# Patient Record
Sex: MALE | Race: ASIAN | Employment: OTHER | URBAN - METROPOLITAN AREA
[De-identification: names, ages, dates, MRNs, and addresses within clinical notes are randomized per-mention and may not be internally consistent; named-entity substitution may affect disease eponyms.]

---

## 2017-01-01 ENCOUNTER — HOSPITAL ENCOUNTER (OUTPATIENT)
Dept: CT IMAGING | Facility: HOSPITAL | Age: 77
Discharge: HOME | End: 2017-01-01
Attending: INTERNAL MEDICINE
Payer: MEDICARE

## 2017-01-01 ENCOUNTER — APPOINTMENT (OUTPATIENT)
Dept: CV DIAGNOSTICS | Facility: HOSPITAL | Age: 77
DRG: 308 | End: 2017-01-01
Attending: INTERNAL MEDICINE
Payer: MEDICARE

## 2017-01-01 ENCOUNTER — HOSPITAL ENCOUNTER (INPATIENT)
Facility: HOSPITAL | Age: 77
LOS: 1 days | Discharge: HOME | DRG: 308 | End: 2017-01-01
Attending: EMERGENCY MEDICINE | Admitting: INTERNAL MEDICINE
Payer: MEDICARE

## 2017-01-01 ENCOUNTER — APPOINTMENT (OUTPATIENT)
Dept: CT IMAGING | Facility: HOSPITAL | Age: 77
DRG: 308 | End: 2017-01-01
Attending: HOSPITALIST
Payer: MEDICARE

## 2017-01-01 ENCOUNTER — APPOINTMENT (OUTPATIENT)
Dept: LAB | Facility: HOSPITAL | Age: 77
End: 2017-01-01
Attending: INTERNAL MEDICINE
Payer: MEDICARE

## 2017-01-01 ENCOUNTER — HOSPITAL ENCOUNTER (EMERGENCY)
Facility: HOSPITAL | Age: 77
Discharge: HOME | End: 2017-01-01
Payer: MEDICARE

## 2017-01-01 ENCOUNTER — APPOINTMENT (OUTPATIENT)
Dept: GENERAL RADIOLOGY | Facility: HOSPITAL | Age: 77
DRG: 308 | End: 2017-01-01
Attending: EMERGENCY MEDICINE
Payer: MEDICARE

## 2017-01-01 ENCOUNTER — APPOINTMENT (OUTPATIENT)
Dept: ULTRASOUND IMAGING | Facility: HOSPITAL | Age: 77
DRG: 308 | End: 2017-01-01
Attending: INTERNAL MEDICINE
Payer: MEDICARE

## 2017-01-01 ENCOUNTER — APPOINTMENT (OUTPATIENT)
Dept: CT IMAGING | Facility: HOSPITAL | Age: 77
End: 2017-01-01
Attending: INTERNAL MEDICINE
Payer: MEDICARE

## 2017-01-01 ENCOUNTER — HOSPITAL ENCOUNTER (OUTPATIENT)
Dept: NUCLEAR MEDICINE | Facility: HOSPITAL | Age: 77
Discharge: HOME | End: 2017-01-01
Attending: INTERNAL MEDICINE
Payer: MEDICARE

## 2017-01-01 ENCOUNTER — HOSPITAL ENCOUNTER (OUTPATIENT)
Dept: CV DIAGNOSTICS | Facility: HOSPITAL | Age: 77
Discharge: HOME | DRG: 308 | End: 2017-01-01
Attending: INTERNAL MEDICINE
Payer: MEDICARE

## 2017-01-01 ENCOUNTER — HOSPITAL ENCOUNTER (OUTPATIENT)
Dept: INTERVENTIONAL RADIOLOGY/VASCULAR | Facility: HOSPITAL | Age: 77
Discharge: HOME | End: 2017-01-01
Attending: INTERNAL MEDICINE | Admitting: INTERNAL MEDICINE
Payer: MEDICARE

## 2017-01-01 VITALS
HEART RATE: 75 BPM | WEIGHT: 132 LBS | TEMPERATURE: 98 F | BODY MASS INDEX: 19.55 KG/M2 | SYSTOLIC BLOOD PRESSURE: 164 MMHG | DIASTOLIC BLOOD PRESSURE: 101 MMHG | HEIGHT: 69 IN | RESPIRATION RATE: 19 BRPM | OXYGEN SATURATION: 100 %

## 2017-01-01 VITALS
OXYGEN SATURATION: 100 % | TEMPERATURE: 98 F | RESPIRATION RATE: 20 BRPM | BODY MASS INDEX: 18.96 KG/M2 | HEIGHT: 69 IN | SYSTOLIC BLOOD PRESSURE: 132 MMHG | HEART RATE: 68 BPM | WEIGHT: 128 LBS | DIASTOLIC BLOOD PRESSURE: 87 MMHG

## 2017-01-01 VITALS
HEART RATE: 96 BPM | SYSTOLIC BLOOD PRESSURE: 132 MMHG | TEMPERATURE: 97 F | HEIGHT: 69 IN | DIASTOLIC BLOOD PRESSURE: 90 MMHG | BODY MASS INDEX: 20.73 KG/M2 | WEIGHT: 140 LBS | OXYGEN SATURATION: 98 % | RESPIRATION RATE: 18 BRPM

## 2017-01-01 VITALS — WEIGHT: 132 LBS | HEIGHT: 69 IN | BODY MASS INDEX: 19.55 KG/M2

## 2017-01-01 VITALS
SYSTOLIC BLOOD PRESSURE: 142 MMHG | DIASTOLIC BLOOD PRESSURE: 95 MMHG | RESPIRATION RATE: 20 BRPM | HEART RATE: 85 BPM | OXYGEN SATURATION: 99 % | TEMPERATURE: 97 F | BODY MASS INDEX: 19.55 KG/M2 | HEIGHT: 69 IN | WEIGHT: 132 LBS

## 2017-01-01 DIAGNOSIS — C78.7 METASTASIS TO LIVER (HCC): Primary | ICD-10-CM

## 2017-01-01 DIAGNOSIS — C78.7 SECONDARY MALIGNANT NEOPLASM OF LIVER (HCC): ICD-10-CM

## 2017-01-01 DIAGNOSIS — C78.7 METASTASIS TO LIVER (HCC): ICD-10-CM

## 2017-01-01 DIAGNOSIS — C18.7 MALIGNANT NEOPLASM OF SIGMOID COLON (HCC): ICD-10-CM

## 2017-01-01 DIAGNOSIS — C78.01 SECONDARY MALIGNANT NEOPLASM OF RIGHT LUNG (HCC): ICD-10-CM

## 2017-01-01 DIAGNOSIS — I48.91 ATRIAL FIBRILLATION WITH RAPID VENTRICULAR RESPONSE (HCC): Primary | ICD-10-CM

## 2017-01-01 DIAGNOSIS — C78.00 COLON CANCER METASTASIZED TO LUNG (HCC): ICD-10-CM

## 2017-01-01 DIAGNOSIS — C18.9 COLON CANCER METASTASIZED TO LUNG (HCC): ICD-10-CM

## 2017-01-01 DIAGNOSIS — C18.9 COLON CARCINOMA METASTATIC TO LIVER (HCC): ICD-10-CM

## 2017-01-01 DIAGNOSIS — C78.02 SECONDARY MALIGNANT NEOPLASM OF LEFT LUNG (HCC): ICD-10-CM

## 2017-01-01 DIAGNOSIS — R33.9 URINARY RETENTION: Primary | ICD-10-CM

## 2017-01-01 DIAGNOSIS — Z91.89 COLON CANCER HIGH RISK: ICD-10-CM

## 2017-01-01 DIAGNOSIS — C78.7 COLON CARCINOMA METASTATIC TO LIVER (HCC): ICD-10-CM

## 2017-01-01 DIAGNOSIS — R73.09 ELEVATED GLUCOSE: ICD-10-CM

## 2017-01-01 LAB
BRAF CRC: NOT DETECTED
ERYTHROCYTE [DISTWIDTH] IN BLOOD BY AUTOMATED COUNT: 14 % (ref 11.5–16)
GLUCOSE BLD-MCNC: 105 MG/DL (ref 65–99)
HCT VFR BLD AUTO: 46.6 % (ref 37–53)
HGB BLD-MCNC: 15.4 G/DL (ref 13–17)
INR BLD: 1.07 (ref 0.89–1.11)
INR CARTRIDGE LOT #: 210
INR: 1.1 (ref 0.8–1.3)
KRAS CRC: DETECTED
MCH RBC QN AUTO: 28.8 PG (ref 27–33.2)
MCHC RBC AUTO-ENTMCNC: 33 G/DL (ref 31–37)
MCV RBC AUTO: 87.3 FL (ref 80–99)
NRAS CRC: NOT DETECTED
PERCENT OF CELLS/CIRCUMFEREN: 0
PIK3CA CRC: NOT DETECTED
PLATELET # BLD AUTO: 267 10(3)UL (ref 150–450)
PSA SERPL DL<=0.01 NG/ML-MCNC: 13.9 SECONDS (ref 12–14.3)
RBC # BLD AUTO: 5.34 X10(6)UL (ref 3.8–5.8)
RED CELL DISTRIBUTION WIDTH-SD: 44.4 FL (ref 35.1–46.3)
WBC # BLD AUTO: 7.3 X10(3) UL (ref 4–13)

## 2017-01-01 PROCEDURE — 0JH60WZ INSERTION OF TOTALLY IMPLANTABLE VASCULAR ACCESS DEVICE INTO CHEST SUBCUTANEOUS TISSUE AND FASCIA, OPEN APPROACH: ICD-10-PCS | Performed by: RADIOLOGY

## 2017-01-01 PROCEDURE — 88307 TISSUE EXAM BY PATHOLOGIST: CPT | Performed by: INTERNAL MEDICINE

## 2017-01-01 PROCEDURE — 80053 COMPREHEN METABOLIC PANEL: CPT | Performed by: EMERGENCY MEDICINE

## 2017-01-01 PROCEDURE — 85027 COMPLETE CBC AUTOMATED: CPT | Performed by: RADIOLOGY

## 2017-01-01 PROCEDURE — 99285 EMERGENCY DEPT VISIT HI MDM: CPT

## 2017-01-01 PROCEDURE — 85027 COMPLETE CBC AUTOMATED: CPT | Performed by: INTERNAL MEDICINE

## 2017-01-01 PROCEDURE — 71010 XR CHEST AP PORTABLE  (CPT=71010): CPT | Performed by: EMERGENCY MEDICINE

## 2017-01-01 PROCEDURE — 93306 TTE W/DOPPLER COMPLETE: CPT | Performed by: INTERNAL MEDICINE

## 2017-01-01 PROCEDURE — 81210 BRAF GENE: CPT | Performed by: INTERNAL MEDICINE

## 2017-01-01 PROCEDURE — 81003 URINALYSIS AUTO W/O SCOPE: CPT | Performed by: INTERNAL MEDICINE

## 2017-01-01 PROCEDURE — 99152 MOD SED SAME PHYS/QHP 5/>YRS: CPT

## 2017-01-01 PROCEDURE — 74177 CT ABD & PELVIS W/CONTRAST: CPT | Performed by: INTERNAL MEDICINE

## 2017-01-01 PROCEDURE — 83036 HEMOGLOBIN GLYCOSYLATED A1C: CPT | Performed by: HOSPITALIST

## 2017-01-01 PROCEDURE — 93005 ELECTROCARDIOGRAM TRACING: CPT

## 2017-01-01 PROCEDURE — 36415 COLL VENOUS BLD VENIPUNCTURE: CPT

## 2017-01-01 PROCEDURE — 85730 THROMBOPLASTIN TIME PARTIAL: CPT | Performed by: INTERNAL MEDICINE

## 2017-01-01 PROCEDURE — 77012 CT SCAN FOR NEEDLE BIOPSY: CPT | Performed by: INTERNAL MEDICINE

## 2017-01-01 PROCEDURE — 77001 FLUOROGUIDE FOR VEIN DEVICE: CPT

## 2017-01-01 PROCEDURE — 85025 COMPLETE CBC W/AUTO DIFF WBC: CPT | Performed by: INTERNAL MEDICINE

## 2017-01-01 PROCEDURE — 02HV33Z INSERTION OF INFUSION DEVICE INTO SUPERIOR VENA CAVA, PERCUTANEOUS APPROACH: ICD-10-PCS | Performed by: RADIOLOGY

## 2017-01-01 PROCEDURE — 99283 EMERGENCY DEPT VISIT LOW MDM: CPT

## 2017-01-01 PROCEDURE — 82962 GLUCOSE BLOOD TEST: CPT

## 2017-01-01 PROCEDURE — 81001 URINALYSIS AUTO W/SCOPE: CPT

## 2017-01-01 PROCEDURE — 80048 BASIC METABOLIC PNL TOTAL CA: CPT | Performed by: HOSPITALIST

## 2017-01-01 PROCEDURE — 99153 MOD SED SAME PHYS/QHP EA: CPT

## 2017-01-01 PROCEDURE — 88381 MICRODISSECTION MANUAL: CPT | Performed by: INTERNAL MEDICINE

## 2017-01-01 PROCEDURE — 76937 US GUIDE VASCULAR ACCESS: CPT

## 2017-01-01 PROCEDURE — 93010 ELECTROCARDIOGRAM REPORT: CPT

## 2017-01-01 PROCEDURE — 93010 ELECTROCARDIOGRAM REPORT: CPT | Performed by: INTERNAL MEDICINE

## 2017-01-01 PROCEDURE — 78815 PET IMAGE W/CT SKULL-THIGH: CPT | Performed by: INTERNAL MEDICINE

## 2017-01-01 PROCEDURE — 84443 ASSAY THYROID STIM HORMONE: CPT | Performed by: EMERGENCY MEDICINE

## 2017-01-01 PROCEDURE — 84484 ASSAY OF TROPONIN QUANT: CPT | Performed by: EMERGENCY MEDICINE

## 2017-01-01 PROCEDURE — 85378 FIBRIN DEGRADE SEMIQUANT: CPT | Performed by: HOSPITALIST

## 2017-01-01 PROCEDURE — 85007 BL SMEAR W/DIFF WBC COUNT: CPT | Performed by: EMERGENCY MEDICINE

## 2017-01-01 PROCEDURE — 36592 COLLECT BLOOD FROM PICC: CPT

## 2017-01-01 PROCEDURE — 81275 KRAS GENE VARIANTS EXON 2: CPT | Performed by: INTERNAL MEDICINE

## 2017-01-01 PROCEDURE — 85610 PROTHROMBIN TIME: CPT | Performed by: EMERGENCY MEDICINE

## 2017-01-01 PROCEDURE — 85730 THROMBOPLASTIN TIME PARTIAL: CPT | Performed by: HOSPITALIST

## 2017-01-01 PROCEDURE — 96366 THER/PROPH/DIAG IV INF ADDON: CPT

## 2017-01-01 PROCEDURE — 81301 MICROSATELLITE INSTABILITY: CPT | Performed by: INTERNAL MEDICINE

## 2017-01-01 PROCEDURE — 85007 BL SMEAR W/DIFF WBC COUNT: CPT | Performed by: INTERNAL MEDICINE

## 2017-01-01 PROCEDURE — 96365 THER/PROPH/DIAG IV INF INIT: CPT

## 2017-01-01 PROCEDURE — 85025 COMPLETE CBC W/AUTO DIFF WBC: CPT | Performed by: EMERGENCY MEDICINE

## 2017-01-01 PROCEDURE — 71260 CT THORAX DX C+: CPT | Performed by: INTERNAL MEDICINE

## 2017-01-01 PROCEDURE — 85027 COMPLETE CBC AUTOMATED: CPT | Performed by: EMERGENCY MEDICINE

## 2017-01-01 PROCEDURE — 82565 ASSAY OF CREATININE: CPT

## 2017-01-01 PROCEDURE — 99152 MOD SED SAME PHYS/QHP 5/>YRS: CPT | Performed by: INTERNAL MEDICINE

## 2017-01-01 PROCEDURE — 71275 CT ANGIOGRAPHY CHEST: CPT | Performed by: HOSPITALIST

## 2017-01-01 PROCEDURE — 36561 INSERT TUNNELED CV CATH: CPT

## 2017-01-01 PROCEDURE — B518ZZA FLUOROSCOPY OF SUPERIOR VENA CAVA, GUIDANCE: ICD-10-PCS | Performed by: RADIOLOGY

## 2017-01-01 PROCEDURE — 88341 IMHCHEM/IMCYTCHM EA ADD ANTB: CPT | Performed by: INTERNAL MEDICINE

## 2017-01-01 PROCEDURE — 47000 NEEDLE BIOPSY OF LIVER PERQ: CPT | Performed by: INTERNAL MEDICINE

## 2017-01-01 PROCEDURE — 85610 PROTHROMBIN TIME: CPT

## 2017-01-01 PROCEDURE — 93970 EXTREMITY STUDY: CPT | Performed by: INTERNAL MEDICINE

## 2017-01-01 PROCEDURE — 51702 INSERT TEMP BLADDER CATH: CPT

## 2017-01-01 PROCEDURE — 88342 IMHCHEM/IMCYTCHM 1ST ANTB: CPT | Performed by: INTERNAL MEDICINE

## 2017-01-01 PROCEDURE — 81479 UNLISTED MOLECULAR PATHOLOGY: CPT | Performed by: INTERNAL MEDICINE

## 2017-01-01 PROCEDURE — 81404 MOPATH PROCEDURE LEVEL 5: CPT | Performed by: INTERNAL MEDICINE

## 2017-01-01 PROCEDURE — 88360 TUMOR IMMUNOHISTOCHEM/MANUAL: CPT | Performed by: INTERNAL MEDICINE

## 2017-01-01 RX ORDER — BLOOD-GLUCOSE METER
KIT MISCELLANEOUS
Qty: 1 KIT | Refills: 0 | Status: SHIPPED | OUTPATIENT
Start: 2017-01-01 | End: 2018-01-01

## 2017-01-01 RX ORDER — DEXTROSE MONOHYDRATE 25 G/50ML
50 INJECTION, SOLUTION INTRAVENOUS
Status: DISCONTINUED | OUTPATIENT
Start: 2017-01-01 | End: 2017-01-01

## 2017-01-01 RX ORDER — ASPIRIN 325 MG
325 TABLET ORAL DAILY
Qty: 90 TABLET | Refills: 3 | Status: SHIPPED | OUTPATIENT
Start: 2017-01-01 | End: 2018-01-01

## 2017-01-01 RX ORDER — ONDANSETRON HYDROCHLORIDE 8 MG/1
8 TABLET, FILM COATED ORAL EVERY 8 HOURS PRN
COMMUNITY
End: 2018-01-01

## 2017-01-01 RX ORDER — MIDAZOLAM HYDROCHLORIDE 1 MG/ML
1 INJECTION INTRAMUSCULAR; INTRAVENOUS EVERY 5 MIN PRN
Status: ACTIVE | OUTPATIENT
Start: 2017-01-01 | End: 2017-01-01

## 2017-01-01 RX ORDER — SODIUM CHLORIDE 9 MG/ML
INJECTION, SOLUTION INTRAVENOUS CONTINUOUS
Status: ACTIVE | OUTPATIENT
Start: 2017-01-01 | End: 2017-01-01

## 2017-01-01 RX ORDER — METOPROLOL SUCCINATE 50 MG/1
50 TABLET, EXTENDED RELEASE ORAL
Status: DISCONTINUED | OUTPATIENT
Start: 2017-01-01 | End: 2017-01-01

## 2017-01-01 RX ORDER — HYDROCODONE BITARTRATE AND ACETAMINOPHEN 5; 325 MG/1; MG/1
2 TABLET ORAL EVERY 4 HOURS PRN
Status: DISCONTINUED | OUTPATIENT
Start: 2017-01-01 | End: 2017-01-01

## 2017-01-01 RX ORDER — HEPARIN SODIUM AND DEXTROSE 10000; 5 [USP'U]/100ML; G/100ML
18 INJECTION INTRAVENOUS ONCE
Status: COMPLETED | OUTPATIENT
Start: 2017-01-01 | End: 2017-01-01

## 2017-01-01 RX ORDER — ASPIRIN 325 MG
325 TABLET ORAL DAILY
Status: DISCONTINUED | OUTPATIENT
Start: 2017-01-01 | End: 2017-01-01

## 2017-01-01 RX ORDER — MIDAZOLAM HYDROCHLORIDE 1 MG/ML
INJECTION INTRAMUSCULAR; INTRAVENOUS
Status: COMPLETED
Start: 2017-01-01 | End: 2017-01-01

## 2017-01-01 RX ORDER — ACETAMINOPHEN 325 MG/1
650 TABLET ORAL EVERY 4 HOURS PRN
Status: DISCONTINUED | OUTPATIENT
Start: 2017-01-01 | End: 2017-01-01

## 2017-01-01 RX ORDER — NALOXONE HYDROCHLORIDE 0.4 MG/ML
80 INJECTION, SOLUTION INTRAMUSCULAR; INTRAVENOUS; SUBCUTANEOUS AS NEEDED
Status: DISCONTINUED | OUTPATIENT
Start: 2017-01-01 | End: 2017-01-01

## 2017-01-01 RX ORDER — HEPARIN SODIUM 5000 [USP'U]/ML
80 INJECTION INTRAVENOUS; SUBCUTANEOUS ONCE
Status: COMPLETED | OUTPATIENT
Start: 2017-01-01 | End: 2017-01-01

## 2017-01-01 RX ORDER — VIT A/VIT C/VIT E/ZINC/COPPER 7160-113
1 TABLET, DELAYED RELEASE (ENTERIC COATED) ORAL DAILY
COMMUNITY
End: 2018-01-01

## 2017-01-01 RX ORDER — ASPIRIN 81 MG/1
81 TABLET ORAL DAILY
Status: DISCONTINUED | OUTPATIENT
Start: 2017-01-01 | End: 2017-01-01

## 2017-01-01 RX ORDER — SODIUM CHLORIDE 9 MG/ML
INJECTION, SOLUTION INTRAVENOUS CONTINUOUS
Status: DISCONTINUED | OUTPATIENT
Start: 2017-01-01 | End: 2017-01-01

## 2017-01-01 RX ORDER — DILTIAZEM HYDROCHLORIDE 5 MG/ML
20 INJECTION INTRAVENOUS ONCE
Status: COMPLETED | OUTPATIENT
Start: 2017-01-01 | End: 2017-01-01

## 2017-01-01 RX ORDER — FINASTERIDE 5 MG/1
5 TABLET, FILM COATED ORAL DAILY
Status: DISCONTINUED | OUTPATIENT
Start: 2017-01-01 | End: 2017-01-01

## 2017-01-01 RX ORDER — CEFAZOLIN SODIUM 1 G/3ML
INJECTION, POWDER, FOR SOLUTION INTRAMUSCULAR; INTRAVENOUS
Status: COMPLETED
Start: 2017-01-01 | End: 2017-01-01

## 2017-01-01 RX ORDER — LIDOCAINE HYDROCHLORIDE 10 MG/ML
INJECTION, SOLUTION INFILTRATION; PERINEURAL
Status: COMPLETED
Start: 2017-01-01 | End: 2017-01-01

## 2017-01-01 RX ORDER — VITS A,C,E/LUTEIN/MINERALS 300MCG-200
1 TABLET ORAL DAILY
Status: DISCONTINUED | OUTPATIENT
Start: 2017-01-01 | End: 2017-01-01

## 2017-01-01 RX ORDER — HEPARIN SODIUM AND DEXTROSE 10000; 5 [USP'U]/100ML; G/100ML
INJECTION INTRAVENOUS CONTINUOUS
Status: DISCONTINUED | OUTPATIENT
Start: 2017-01-01 | End: 2017-01-01

## 2017-01-01 RX ORDER — GARLIC EXTRACT 500 MG
1 CAPSULE ORAL DAILY
COMMUNITY
End: 2018-01-01

## 2017-01-01 RX ORDER — DILTIAZEM HCL-SODIUM CHLORIDE IV SOLN 125 MG/125ML-0.9% 125-0.9/125 MG/ML-%
5 SOLUTION INTRAVENOUS CONTINUOUS
Status: DISCONTINUED | OUTPATIENT
Start: 2017-01-01 | End: 2017-01-01

## 2017-01-01 RX ORDER — HYDROCODONE BITARTRATE AND ACETAMINOPHEN 5; 325 MG/1; MG/1
1 TABLET ORAL EVERY 4 HOURS PRN
Status: DISCONTINUED | OUTPATIENT
Start: 2017-01-01 | End: 2017-01-01

## 2017-01-01 RX ORDER — FLUMAZENIL 0.1 MG/ML
0.2 INJECTION, SOLUTION INTRAVENOUS AS NEEDED
Status: DISCONTINUED | OUTPATIENT
Start: 2017-01-01 | End: 2017-01-01

## 2017-01-01 RX ORDER — ONDANSETRON 4 MG/1
8 TABLET, FILM COATED ORAL EVERY 8 HOURS PRN
Status: DISCONTINUED | OUTPATIENT
Start: 2017-01-01 | End: 2017-01-01

## 2017-01-01 RX ORDER — LIDOCAINE HYDROCHLORIDE AND EPINEPHRINE 15; 5 MG/ML; UG/ML
INJECTION, SOLUTION EPIDURAL
Status: COMPLETED
Start: 2017-01-01 | End: 2017-01-01

## 2017-01-01 RX ORDER — HEPARIN SODIUM 5000 [USP'U]/ML
INJECTION, SOLUTION INTRAVENOUS; SUBCUTANEOUS
Status: COMPLETED
Start: 2017-01-01 | End: 2017-01-01

## 2017-01-01 RX ORDER — LANCETS 28 GAUGE
EACH MISCELLANEOUS
Qty: 50 EACH | Refills: 0 | Status: SHIPPED | OUTPATIENT
Start: 2017-01-01 | End: 2018-01-01

## 2017-01-01 RX ORDER — BACITRACIN 50000 [USP'U]/1
INJECTION, POWDER, LYOPHILIZED, FOR SOLUTION INTRAMUSCULAR
Status: COMPLETED
Start: 2017-01-01 | End: 2017-01-01

## 2017-01-01 RX ORDER — ACETAMINOPHEN 160 MG
2000 TABLET,DISINTEGRATING ORAL DAILY
Status: DISCONTINUED | OUTPATIENT
Start: 2017-01-01 | End: 2017-01-01

## 2017-01-01 RX ADMIN — MIDAZOLAM HYDROCHLORIDE 1 MG: 1 INJECTION INTRAMUSCULAR; INTRAVENOUS at 11:12:00

## 2017-01-01 RX ADMIN — SODIUM CHLORIDE: 9 INJECTION, SOLUTION INTRAVENOUS at 09:15:00

## 2017-01-01 RX ADMIN — SODIUM CHLORIDE: 9 INJECTION, SOLUTION INTRAVENOUS at 11:10:00

## 2017-03-24 PROBLEM — H35.342 MACULAR HOLE, LEFT: Status: ACTIVE | Noted: 2017-03-24

## 2017-03-24 PROBLEM — H33.002: Status: ACTIVE | Noted: 2017-03-24

## 2017-03-24 PROBLEM — Z96.1 PSEUDOPHAKIA: Status: ACTIVE | Noted: 2017-03-24

## 2017-03-24 PROBLEM — H44.23 MYOPIC DEGENERATION, BILATERAL: Status: ACTIVE | Noted: 2017-03-24

## 2017-04-04 ENCOUNTER — HOSPITAL (OUTPATIENT)
Dept: OTHER | Age: 77
End: 2017-04-04
Attending: OPHTHALMOLOGY

## 2017-04-06 ENCOUNTER — HOSPITAL ENCOUNTER (EMERGENCY)
Facility: HOSPITAL | Age: 77
Discharge: HOME | End: 2017-04-07
Attending: EMERGENCY MEDICINE
Payer: MEDICARE

## 2017-04-06 ENCOUNTER — HOSPITAL (OUTPATIENT)
Dept: OTHER | Age: 77
End: 2017-04-06
Attending: OPHTHALMOLOGY

## 2017-04-06 DIAGNOSIS — R33.9 URINARY RETENTION: Primary | ICD-10-CM

## 2017-04-06 PROCEDURE — 99283 EMERGENCY DEPT VISIT LOW MDM: CPT

## 2017-04-06 PROCEDURE — 36415 COLL VENOUS BLD VENIPUNCTURE: CPT

## 2017-04-06 PROCEDURE — 85025 COMPLETE CBC W/AUTO DIFF WBC: CPT | Performed by: EMERGENCY MEDICINE

## 2017-04-06 PROCEDURE — 81001 URINALYSIS AUTO W/SCOPE: CPT | Performed by: EMERGENCY MEDICINE

## 2017-04-06 PROCEDURE — 51702 INSERT TEMP BLADDER CATH: CPT

## 2017-04-06 PROCEDURE — 80053 COMPREHEN METABOLIC PANEL: CPT | Performed by: EMERGENCY MEDICINE

## 2017-04-07 VITALS
HEIGHT: 69 IN | SYSTOLIC BLOOD PRESSURE: 134 MMHG | HEART RATE: 72 BPM | WEIGHT: 140 LBS | OXYGEN SATURATION: 95 % | DIASTOLIC BLOOD PRESSURE: 79 MMHG | BODY MASS INDEX: 20.73 KG/M2 | RESPIRATION RATE: 18 BRPM | TEMPERATURE: 99 F

## 2017-04-07 NOTE — ED NOTES
Educated patient in depth about alvarado catheter care. Family member present did not want to be involved in discharge information. Patient verbalized understanding and performed return demonstration.

## 2017-04-07 NOTE — ED PROVIDER NOTES
Patient Seen in: [facility] Emergency Department    History   Patient presents with:  Urinary Symptoms (urologic)    Stated Complaint: urinary retention     HPI    79-year-old male coming for evaluation for urinary retention.   He had retinal detachm (OCUVITE ADULT FORMULA) Oral Cap,  Take  by mouth. Cholecalciferol (VITAMIN D-3 OR),  Take 2,000 Units by mouth daily. ASPIRIN 81 MG OR TABS,  Take  by mouth 2 (two) times daily. No family history on file.       Smoking Status: Never Smoker normal.   Nursing note and vitals reviewed.            ED Course     Labs Reviewed   URINALYSIS WITH CULTURE REFLEX - Abnormal; Notable for the following:     Glucose Urine 50  (*)     Blood Urine Small (*)     RBC URINE >10 (*)     All other components wit

## 2017-04-10 PROBLEM — R33.8 BPH (BENIGN PROSTATIC HYPERTROPHY) WITH URINARY RETENTION: Status: ACTIVE | Noted: 2017-04-10

## 2017-04-10 PROBLEM — N40.1 BPH (BENIGN PROSTATIC HYPERTROPHY) WITH URINARY RETENTION: Status: ACTIVE | Noted: 2017-04-10

## 2017-04-11 ENCOUNTER — HOSPITAL ENCOUNTER (EMERGENCY)
Facility: HOSPITAL | Age: 77
Discharge: HOME | End: 2017-04-11
Attending: EMERGENCY MEDICINE
Payer: MEDICARE

## 2017-04-11 VITALS
BODY MASS INDEX: 20.73 KG/M2 | OXYGEN SATURATION: 96 % | SYSTOLIC BLOOD PRESSURE: 122 MMHG | HEIGHT: 69 IN | WEIGHT: 140 LBS | RESPIRATION RATE: 16 BRPM | TEMPERATURE: 98 F | HEART RATE: 105 BPM | DIASTOLIC BLOOD PRESSURE: 94 MMHG

## 2017-04-11 DIAGNOSIS — R33.9 URINARY RETENTION: Primary | ICD-10-CM

## 2017-04-11 PROCEDURE — 81001 URINALYSIS AUTO W/SCOPE: CPT | Performed by: EMERGENCY MEDICINE

## 2017-04-11 PROCEDURE — 99283 EMERGENCY DEPT VISIT LOW MDM: CPT

## 2017-04-11 NOTE — ED PROVIDER NOTES
Patient Seen in: [facility] Emergency Department    History   Patient presents with:  Urinary Symptoms (urologic)    Stated Complaint: unable to urinate     HPI    72-year-old [facility] male who presents to the emergency room today for complaint of ur FORMULA) Oral Cap,  Take  by mouth. Cholecalciferol (VITAMIN D-3 OR),  Take 2,000 Units by mouth daily. ASPIRIN 81 MG OR TABS,  Take  by mouth 2 (two) times daily. No family history on file.       Smoking Status: Never Smoker Notable for the following:     Blood Urine Small (*)     Protein Urine 30  (*)     All other components within normal limits   UA HOLD       MDM   Patient appears to have acute urinary retention again.   He will be discharged home to follow-up with his PCP

## 2017-04-11 NOTE — ED NOTES
Dr. Taveras Service ([phone]) aware of pt in ED. RN informed Dr. Taveras Service of alvarado placed with 525ml output. Pt transported to A5 for further care.

## 2017-04-11 NOTE — ED NOTES
Pt sts that he had retinal detachment sx x 1 wk ago. Pt laying flat on abd per post surgical instructions.  Pt sts pressure in ABD is gone and denies pain or discomfort at this time

## 2017-04-11 NOTE — ED INITIAL ASSESSMENT (HPI)
Pt sts that he had a Cristobal D/C'd x 1 day ago and has retention since last noc. Pt sts that \"just a little comes out\".  + abd pain & pressure

## 2017-04-29 NOTE — ED PROVIDER NOTES
Patient Seen in: [facility] Emergency Department    History   Patient presents with:  Urinary Symptoms (urologic)    Stated Complaint: urinary retention    HPI    Patient is a 29-year-old presented to the ER overnight with complaint of difficulty ur PHACOEMULSIFICATION OF CATARACT WITH INTRAOCULAR LENS IMPLANT 30053;  Surgeon:  Sole Robles MD;  Location: [address] [place] [address]    COLONOSCOPY,DIAGNOSTIC N/A 12/16/2014    Comment Procedure: COLONOSCOPY, POSSIBLE BIOPSY, POSSIBLE POLYPECTOMY 32623; clear yellow discharge from about 800 cc in the bag  Back: No costovertebral angle tenderness. Extremities: Warm, well perfused, without edema     Skin: Unremarkable without lesions or rash.      Neurologic: Awake alert and oriented x 3 with clear speec

## 2017-05-05 PROBLEM — N48.89 PENILE PAIN: Status: ACTIVE | Noted: 2017-01-01

## 2017-05-05 PROBLEM — R30.0 DYSURIA: Status: ACTIVE | Noted: 2017-01-01

## 2017-05-10 PROBLEM — N40.1 ENLARGED PROSTATE WITH LOWER URINARY TRACT SYMPTOMS (LUTS): Status: ACTIVE | Noted: 2017-04-10

## 2017-05-10 PROBLEM — R33.9 INCOMPLETE BLADDER EMPTYING: Status: ACTIVE | Noted: 2017-01-01

## 2017-09-05 PROBLEM — Z91.89 COLON CANCER HIGH RISK: Status: ACTIVE | Noted: 2017-01-01

## 2017-09-08 ENCOUNTER — PRIOR ORIGINAL RECORDS (OUTPATIENT)
Dept: OTHER | Age: 77
End: 2017-09-08

## 2017-09-14 NOTE — PROCEDURES
[facility]  Procedure Note    [address] Patient Status:  Outpatient    1940 MRN [id number]   [facility] CT Attending Jennifer Kiser, *   Hosp Day # 0 PCP Shayna Church MD     Procedure: liver biopsy of right lob

## 2017-09-14 NOTE — OR NURSING
Dr. Rashaad Roach updated on pt condition and order recv'd for d/c to home. Daughter notified and will arrive to pick pt up.

## 2017-09-14 NOTE — IMAGING NOTE
CT guided liver lesion biopsy with Dr. Fernandez Delacruz. Pt tolerated well. Vss. PIV maintained throughout procedure. Right low abdomen site & dressing slight ooze noted. Report to Yeny Flores [facility]. Transport to room 2258.

## 2017-09-19 ENCOUNTER — PRIOR ORIGINAL RECORDS (OUTPATIENT)
Dept: OTHER | Age: 77
End: 2017-09-19

## 2017-09-20 ENCOUNTER — PRIOR ORIGINAL RECORDS (OUTPATIENT)
Dept: OTHER | Age: 77
End: 2017-09-20

## 2017-09-22 NOTE — PRE-SEDATION ASSESSMENT
H&P dated 9/19/17 reviewed, no changes. Pt for chest port placement. Previous sedation without complication. Airway checked.   I have discussed with the patient the potential benefits, risks and side effects of this procedure, the likelihood of the patient

## 2017-09-22 NOTE — PROGRESS NOTES
Pt A/Ox4. CORREIA. HOB elevated. Right chest incision with mepilex dressing, CDI. Denies any pain. VSS. Voiding without difficulty. Off bedrest at 1400, ambulated in unit. Discharge instructions given, pt verbalized understanding. Pt discharged via wheelchair.

## 2017-09-22 NOTE — PROCEDURES
[facility]  Procedure Note    Smooth Zuluaga Patient Status:  Outpatient in a Bed    1940 MRN [id number]   Location [address] [facility] Attending [address] Day # 0 PCP MD Marely Bonilla

## 2017-09-25 ENCOUNTER — PRIOR ORIGINAL RECORDS (OUTPATIENT)
Dept: OTHER | Age: 77
End: 2017-09-25

## 2017-09-26 ENCOUNTER — PRIOR ORIGINAL RECORDS (OUTPATIENT)
Dept: OTHER | Age: 77
End: 2017-09-26

## 2017-10-10 ENCOUNTER — PRIOR ORIGINAL RECORDS (OUTPATIENT)
Dept: OTHER | Age: 77
End: 2017-10-10

## 2017-10-24 ENCOUNTER — PRIOR ORIGINAL RECORDS (OUTPATIENT)
Dept: OTHER | Age: 77
End: 2017-10-24

## 2017-10-26 ENCOUNTER — HOSPITAL (OUTPATIENT)
Dept: OTHER | Age: 77
End: 2017-10-26
Attending: OPHTHALMOLOGY

## 2017-11-06 PROBLEM — I10 ESSENTIAL HYPERTENSION: Status: ACTIVE | Noted: 2017-01-01

## 2017-11-07 ENCOUNTER — PRIOR ORIGINAL RECORDS (OUTPATIENT)
Dept: OTHER | Age: 77
End: 2017-11-07

## 2017-11-20 ENCOUNTER — PRIOR ORIGINAL RECORDS (OUTPATIENT)
Dept: OTHER | Age: 77
End: 2017-11-20

## 2017-12-04 ENCOUNTER — PRIOR ORIGINAL RECORDS (OUTPATIENT)
Dept: OTHER | Age: 77
End: 2017-12-04

## 2017-12-04 PROBLEM — I48.91 ATRIAL FIBRILLATION WITH RAPID VENTRICULAR RESPONSE (HCC): Status: ACTIVE | Noted: 2017-01-01

## 2017-12-04 NOTE — H&P
[facility] hospitalist H+P  PCP: Agustin Keller MD  CC; tachycardia  HPI 79 yo male with hx of metastatic colon cancer, HTN, cataracts came for chemo, found to be tachycardia, sent to ER, diagnosed with A-fib with RVR, cardiology and oncology consulted.  Currently n family history. Social History  Social History   Marital status:    Spouse name: N/A    Years of education: N/A  Number of children: 2     Occupational History  Professor math, computer graphics at [address] Topics Assessment/plan    79 yo male with hx of metastatic colon cancer, HTN, cataracts came for chemo, found to be tachycardia, sent to ER, diagnosed with A-fib with RVR, cardiology and oncology consulted. A-fib with RVR; on telemetry, dilt IV given.  C

## 2017-12-04 NOTE — CONSULTS
[facility] [address] [place] [address] Cardiology  Consultation Note      LaurieSai Steve Baldev Patient Status:  Emergency    1940 MRN [id number]   Location [address] [facility] Attending Adelso Brasher MD   Hosp Day # 0 PCP Aliyah Salgado MD     Outp ER.  s/p IV  bolus and now gtt at 5mg/hr, rate in the 90s, pt comfortable. No prior hx of AF or arrhythmias in the past.  However, his daughter Melinda You- oncologist) has noted that his HR has been irregular in the past, but AF only confirmed today. [facility]    Family History  family history is not on file. Social History   reports that he has never smoked. He has never used smokeless tobacco. He reports that he drinks alcohol. He reports that he does not use drugs.      Allergies 09/22/2017          Lab Results  Component Value Date   WBC 6.2 12/04/2017   HGB 13.1 12/04/2017   HCT 39.3 12/04/2017   .0 12/04/2017   CREATSERUM 0.96 12/04/2017   BUN 18 12/04/2017    12/04/2017   K 4.4 12/04/2017   CL 97 12/04/2017   CO2 2

## 2017-12-04 NOTE — ED PROVIDER NOTES
Patient Seen in: [facility] Emergency Department    History   Patient presents with:  Arrythmia/Palpitations (cardiovascular)    Stated Complaint: arrythmia    HPI    This is a 74-year-old [place] male complaining of rapid heart rate the patient has a [employer]  7/18/2012: REMV CATARACT EXTRACAP,INSERT LENS      Comment: Procedure: LEFT PHACOEMULSIFICATION OF                CATARACT WITH INTRAOCULAR LENS IMPLANT 61273;                 Surgeon:  Alexis Huffman MD;  Location: [facility]TY [facility], limits   MANUAL DIFFERENTIAL - Abnormal; Notable for the following:     Lymphocyte Absolute Manual 0.50 (*)     Monocyte Absolute Manual 0.06 (*)     Metamyelocyte Absolute Manual 0.19 (*)     Myelocyte Absolute Manual 0.06 (*)     All other components wit Medication List        Present on Admission  Date Reviewed: 11/6/2017          ICD-10-CM Noted POA    Atrial fibrillation with rapid ventricular response ([facility] [address] I48.91 12/4/2017 Unknown

## 2017-12-04 NOTE — ED INITIAL ASSESSMENT (HPI)
Patient was at Dr Arlene Reina office today, EKG done, showed afib with RVR.  Patient has chemo infusing thru port on a portable pump

## 2017-12-05 NOTE — CONSULTS
[facility]    PATIENT'S NAME: Emely Rufino   ATTENDING PHYSICIAN: Taz Pappas M.D.   CONSULTING PHYSICIAN: Jing Ruffin M.D.    PATIENT ACCOUNT#:   [de-identified]    LOCATION:  [address] [place] [address]  MEDICAL RECORD #:   [id number] detachment in the left eye, needing laser surgery in right eye at [facility] in [address] [place] [address]. He recently also had another repeat laser surgery in his left eye for repeat detachment. He also has dental implants.       SOCIAL HISTORY:  He is  fibrillation with rapid ventricular response. 2.   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 3.   History of atrial fibrillation. 4.   History of benign prostatic hypertrophy.     DISCUSSION:  The patient has had recent

## 2017-12-05 NOTE — CERTIFICATION
**Certification    PHYSICIAN Certification of Need for Inpatient Hospitalization    Based on the his current state of illness, LaurieSai Bowden Hi requires inpatient hospitalization for his A-fib

## 2017-12-05 NOTE — PLAN OF CARE
Patient converted back to Aflutter, HR 's. /86. Ekg completed. Dr Sujit Clement notified. Orders to resume cardizem drip at 5mg/hr.

## 2017-12-05 NOTE — PLAN OF CARE
[facility] hospitalist Dr Sharon Beach paged to clarify about starting heparin drip with patients hx of metastatic CA and chemo treatment.  Per MD gaona to start heparin drip as ordered and continue to monitor and will readdress in am.

## 2017-12-05 NOTE — PLAN OF CARE
Converted to SR AT 1705PM, has remained SR AT 70'S  Dr Carter Mooer notified, titrate Cardizem drip slowly  Bp 113/76

## 2017-12-05 NOTE — DISCHARGE SUMMARY
[facility]  Discharge Summary    [address] Patient Status:  Inpatient    1940 MRN [id number]   [facility] 2NE-A Attending Erlinda Chaparro MD   [facility] Day # 1 PCP Heidi Youngblood MD     Date of Admission: 2017    Date of cardiology. Echo. Cardiology decided  mg daily and metorpolol    Small peripheral pulmonary PE; patient denies chest pain, no SOB. Clinical significance uncertain.  There is a risk of bleeding with anticoauglation and may make giving chemo more chall Medicare - do not substitute.       CONTINUE these medications which have NOT CHANGED    Ondansetron HCl (ZOFRAN) 8 MG tablet  Take 8 mg by mouth every 8 (eight) hours as needed for Nausea., Historical    Multiple Vitamins-Minerals (ICAPS AREDS FORMULA persistent dizziness or light-headedness  8. extreme fatigue  9. Numbness/tingling  10.  Difficulty urinating or defecating  11. bleeding     Do not drive when taking pain medications  Do not exceed 4 grams acetminophen within 24 hours       Eloisa Elam  1

## 2017-12-05 NOTE — PROGRESS NOTES
[facility]  Progress Note    [address] Patient Status:  Inpatient    1940 MRN [id number]   [facility] 2NE-A Attending Charmaine Lau MD   Hosp Day # 1 PCP Geri Calderon MD     Chief complaint: Up in chair and fee MD  12/5/2017  8:40 AM

## 2017-12-05 NOTE — PLAN OF CARE
Problem: Patient/Family Goals  Goal: Patient/Family Long Term Goal  Patient's Long Term Goal:return home on NSR    Interventions:  - administer meds as ordered  - See additional Care Plan goals for specific interventions   Outcome: Progressing

## 2017-12-05 NOTE — PLAN OF CARE
Problem: Patient/Family Goals  Goal: Patient/Family Short Term Goal  Patient's Short Term Goal: stable cardiac rhythmn    Interventions:   - monitor and assess response to treatment ,assist with adls  - See additional Care Plan goals for specific intervent

## 2017-12-05 NOTE — PROGRESS NOTES
[address] [facility] Cardiology  Progress Note    LashellToyaSai Sage Patient Status:  Inpatient    1940 MRN [id number]   [facility] 2NE-A Attending Shalonda Rosales MD   Hosp Day # 1 PCP Yayo Regan MD     Outpatient cardiologist:  n Regular rate and regular rhythm; no murmurs/rubs/gallops are appreciated  Lungs: Clear to auscultation bilaterally; no accessory muscle use  Abdomen: Soft, non-tender; bowel sounds are normoactive  Extremities: No clubbing/cyanosis; moves all 4 extremities

## 2017-12-05 NOTE — PLAN OF CARE
Dr Hector Tong [facility] hospitalist) paged to notify of CTA chest results of small peripheral PE to left lower lobe. Orders received for heparin drip.

## 2017-12-06 NOTE — PROGRESS NOTES
Pt viewed free essence video questions asked and answered. Pt aware of everything in kit. Discharge instructions discussed and given to pt discuss with pt's daughter Madeleine Hall on phone she also is aware of glucometer.   Pt's R port was flushed and heparin locked

## 2017-12-18 ENCOUNTER — PRIOR ORIGINAL RECORDS (OUTPATIENT)
Dept: OTHER | Age: 77
End: 2017-12-18

## 2017-12-31 ENCOUNTER — PRIOR ORIGINAL RECORDS (OUTPATIENT)
Dept: OTHER | Age: 77
End: 2017-12-31

## 2018-01-01 ENCOUNTER — APPOINTMENT (OUTPATIENT)
Dept: CT IMAGING | Facility: HOSPITAL | Age: 78
DRG: 871 | End: 2018-01-01
Attending: HOSPITALIST
Payer: MEDICARE

## 2018-01-01 ENCOUNTER — APPOINTMENT (OUTPATIENT)
Dept: GENERAL RADIOLOGY | Facility: HOSPITAL | Age: 78
DRG: 871 | End: 2018-01-01
Attending: INTERNAL MEDICINE
Payer: MEDICARE

## 2018-01-01 ENCOUNTER — HOSPITAL ENCOUNTER (INPATIENT)
Facility: HOSPITAL | Age: 78
LOS: 2 days | Discharge: HOME | DRG: 871 | End: 2018-01-01
Attending: EMERGENCY MEDICINE | Admitting: INTERNAL MEDICINE
Payer: MEDICARE

## 2018-01-01 ENCOUNTER — HOSPITAL ENCOUNTER (INPATIENT)
Facility: HOSPITAL | Age: 78
LOS: 8 days | Discharge: HOSPICE | DRG: 871 | End: 2018-01-01
Attending: EMERGENCY MEDICINE | Admitting: INTERNAL MEDICINE
Payer: MEDICARE

## 2018-01-01 ENCOUNTER — APPOINTMENT (OUTPATIENT)
Dept: GENERAL RADIOLOGY | Facility: HOSPITAL | Age: 78
DRG: 299 | End: 2018-01-01
Attending: INTERNAL MEDICINE
Payer: MEDICARE

## 2018-01-01 ENCOUNTER — APPOINTMENT (OUTPATIENT)
Dept: GENERAL RADIOLOGY | Facility: HOSPITAL | Age: 78
DRG: 871 | End: 2018-01-01
Attending: EMERGENCY MEDICINE
Payer: MEDICARE

## 2018-01-01 ENCOUNTER — HOSPITAL ENCOUNTER (OUTPATIENT)
Dept: CT IMAGING | Facility: HOSPITAL | Age: 78
Discharge: HOME | End: 2018-01-01
Attending: INTERNAL MEDICINE
Payer: MEDICARE

## 2018-01-01 ENCOUNTER — APPOINTMENT (OUTPATIENT)
Dept: CT IMAGING | Facility: HOSPITAL | Age: 78
DRG: 299 | End: 2018-01-01
Attending: EMERGENCY MEDICINE
Payer: MEDICARE

## 2018-01-01 ENCOUNTER — HOSPITAL ENCOUNTER (INPATIENT)
Facility: HOSPITAL | Age: 78
LOS: 2 days | Discharge: HOME | DRG: 299 | End: 2018-01-01
Attending: EMERGENCY MEDICINE | Admitting: INTERNAL MEDICINE
Payer: MEDICARE

## 2018-01-01 ENCOUNTER — HOSPITAL ENCOUNTER (OUTPATIENT)
Dept: ULTRASOUND IMAGING | Facility: HOSPITAL | Age: 78
Discharge: HOME | DRG: 299 | End: 2018-01-01
Attending: INTERNAL MEDICINE
Payer: MEDICARE

## 2018-01-01 VITALS
HEART RATE: 59 BPM | HEIGHT: 69 IN | BODY MASS INDEX: 17.97 KG/M2 | RESPIRATION RATE: 15 BRPM | WEIGHT: 121.31 LBS | DIASTOLIC BLOOD PRESSURE: 88 MMHG | SYSTOLIC BLOOD PRESSURE: 142 MMHG | OXYGEN SATURATION: 100 % | TEMPERATURE: 98 F

## 2018-01-01 VITALS
OXYGEN SATURATION: 90 % | HEIGHT: 69 IN | HEART RATE: 97 BPM | DIASTOLIC BLOOD PRESSURE: 77 MMHG | BODY MASS INDEX: 16.46 KG/M2 | TEMPERATURE: 98 F | SYSTOLIC BLOOD PRESSURE: 121 MMHG | RESPIRATION RATE: 23 BRPM | WEIGHT: 111.13 LBS

## 2018-01-01 VITALS
OXYGEN SATURATION: 98 % | HEIGHT: 69 IN | HEART RATE: 63 BPM | SYSTOLIC BLOOD PRESSURE: 141 MMHG | BODY MASS INDEX: 16.33 KG/M2 | TEMPERATURE: 98 F | RESPIRATION RATE: 17 BRPM | WEIGHT: 110.25 LBS | DIASTOLIC BLOOD PRESSURE: 80 MMHG

## 2018-01-01 DIAGNOSIS — C18.7 ADENOCARCINOMA OF SIGMOID COLON (HCC): ICD-10-CM

## 2018-01-01 DIAGNOSIS — A41.9 SEPSIS DUE TO PNEUMONIA (HCC): Primary | ICD-10-CM

## 2018-01-01 DIAGNOSIS — I82.493 ACUTE DEEP VEIN THROMBOSIS (DVT) OF OTHER SPECIFIED VEIN OF BOTH LOWER EXTREMITIES (HCC): ICD-10-CM

## 2018-01-01 DIAGNOSIS — C78.7 SECONDARY MALIGNANT NEOPLASM OF LIVER (HCC): ICD-10-CM

## 2018-01-01 DIAGNOSIS — C78.01 SECONDARY MALIGNANT NEOPLASM OF RIGHT LUNG (HCC): ICD-10-CM

## 2018-01-01 DIAGNOSIS — C18.9 COLON CANCER METASTASIZED TO LUNG (HCC): ICD-10-CM

## 2018-01-01 DIAGNOSIS — J18.9 COMMUNITY ACQUIRED PNEUMONIA OF LEFT LUNG, UNSPECIFIED PART OF LUNG: Primary | ICD-10-CM

## 2018-01-01 DIAGNOSIS — I48.91 ATRIAL FIBRILLATION WITH RAPID VENTRICULAR RESPONSE (HCC): ICD-10-CM

## 2018-01-01 DIAGNOSIS — C78.02 SECONDARY MALIGNANT NEOPLASM OF LEFT LUNG (HCC): ICD-10-CM

## 2018-01-01 DIAGNOSIS — J18.9 SEPSIS DUE TO PNEUMONIA (HCC): Primary | ICD-10-CM

## 2018-01-01 DIAGNOSIS — J18.9 COMMUNITY ACQUIRED PNEUMONIA, UNSPECIFIED LATERALITY: ICD-10-CM

## 2018-01-01 DIAGNOSIS — I26.99 OTHER ACUTE PULMONARY EMBOLISM WITHOUT ACUTE COR PULMONALE (HCC): Primary | ICD-10-CM

## 2018-01-01 DIAGNOSIS — C78.00 COLON CANCER METASTASIZED TO LUNG (HCC): ICD-10-CM

## 2018-01-01 LAB
ADENOVIRUS PCR:: NEGATIVE
ALBUMIN SERPL-MCNC: 2.5 G/DL (ref 3.5–4.8)
ALBUMIN SERPL-MCNC: 2.8 G/DL (ref 3.5–4.8)
ALP LIVER SERPL-CCNC: 119 U/L (ref 45–117)
ALP LIVER SERPL-CCNC: 122 U/L (ref 45–117)
ALT SERPL-CCNC: 15 U/L (ref 17–63)
ALT SERPL-CCNC: 17 U/L (ref 17–63)
APTT PPP: 35 SECONDS (ref 25–34)
AST SERPL-CCNC: 15 U/L (ref 15–41)
AST SERPL-CCNC: 22 U/L (ref 15–41)
ATRIAL RATE: 359 BPM
ATRIAL RATE: 62 BPM
ATRIAL RATE: 79 BPM
B PERT DNA SPEC QL NAA+PROBE: NEGATIVE
BAND %: 10 %
BAND %: 4 %
BASOPHIL % MANUAL: 0 %
BASOPHIL % MANUAL: 0 %
BASOPHIL ABSOLUTE MANUAL: 0 X10(3) UL (ref 0–0.1)
BASOPHIL ABSOLUTE MANUAL: 0 X10(3) UL (ref 0–0.1)
BASOPHILS # BLD AUTO: 0.01 X10(3) UL (ref 0–0.1)
BASOPHILS # BLD AUTO: 0.01 X10(3) UL (ref 0–0.1)
BASOPHILS NFR BLD AUTO: 0.1 %
BASOPHILS NFR BLD AUTO: 0.2 %
BILIRUB SERPL-MCNC: 0.4 MG/DL (ref 0.1–2)
BILIRUB SERPL-MCNC: 0.4 MG/DL (ref 0.1–2)
BLAST %: 2 %
BLAST ABSOLUTE MANUAL: 0.07 X10(3) UL (ref ?–0.01)
BUN BLD-MCNC: 17 MG/DL (ref 8–20)
BUN BLD-MCNC: 25 MG/DL (ref 8–20)
C PNEUM DNA SPEC QL NAA+PROBE: NEGATIVE
CALCIUM BLD-MCNC: 8.6 MG/DL (ref 8.3–10.3)
CALCIUM BLD-MCNC: 8.9 MG/DL (ref 8.3–10.3)
CHLORIDE: 101 MMOL/L (ref 101–111)
CHLORIDE: 97 MMOL/L (ref 101–111)
CO2: 25 MMOL/L (ref 22–32)
CO2: 29 MMOL/L (ref 22–32)
CORONAVIRUS 229E PCR:: NEGATIVE
CORONAVIRUS HKU1 PCR:: NEGATIVE
CORONAVIRUS NL63 PCR:: NEGATIVE
CORONAVIRUS OC43 PCR:: NEGATIVE
CREAT BLD-MCNC: 0.91 MG/DL (ref 0.7–1.3)
CREAT BLD-MCNC: 0.92 MG/DL (ref 0.7–1.3)
CREAT SERPL-MCNC: 0.7 MG/DL (ref 0.7–1.3)
EOSINOPHIL # BLD AUTO: 0 X10(3) UL (ref 0–0.3)
EOSINOPHIL # BLD AUTO: 0.15 X10(3) UL (ref 0–0.3)
EOSINOPHIL % MANUAL: 0 %
EOSINOPHIL % MANUAL: 0 %
EOSINOPHIL ABSOLUTE MANUAL: 0 X10(3) UL (ref 0–0.3)
EOSINOPHIL ABSOLUTE MANUAL: 0 X10(3) UL (ref 0–0.3)
EOSINOPHIL NFR BLD AUTO: 0 %
EOSINOPHIL NFR BLD AUTO: 1.4 %
ERYTHROCYTE [DISTWIDTH] IN BLOOD BY AUTOMATED COUNT: 16 % (ref 11.5–16)
ERYTHROCYTE [DISTWIDTH] IN BLOOD BY AUTOMATED COUNT: 16.3 % (ref 11.5–16)
ERYTHROCYTE [DISTWIDTH] IN BLOOD BY AUTOMATED COUNT: 16.9 % (ref 11.5–16)
ERYTHROCYTE [DISTWIDTH] IN BLOOD BY AUTOMATED COUNT: 17.2 % (ref 11.5–16)
EST. AVERAGE GLUCOSE BLD GHB EST-MCNC: 131 MG/DL (ref 68–126)
FLUAV RNA SPEC QL NAA+PROBE: NEGATIVE
FLUBV RNA SPEC QL NAA+PROBE: NEGATIVE
FREE T4: 1 NG/DL (ref 0.9–1.8)
GLUCOSE BLD-MCNC: 201 MG/DL (ref 70–99)
GLUCOSE BLD-MCNC: 224 MG/DL (ref 70–99)
HAV IGM SER QL: 2.2 MG/DL (ref 1.7–3)
HBA1C MFR BLD HPLC: 6.2 % (ref ?–5.7)
HCT VFR BLD AUTO: 32.9 % (ref 37–53)
HCT VFR BLD AUTO: 38.4 % (ref 37–53)
HCT VFR BLD AUTO: 40.6 % (ref 37–53)
HCT VFR BLD AUTO: 41.8 % (ref 37–53)
HGB BLD-MCNC: 10.3 G/DL (ref 13–17)
HGB BLD-MCNC: 12.1 G/DL (ref 13–17)
HGB BLD-MCNC: 12.7 G/DL (ref 13–17)
HGB BLD-MCNC: 13.1 G/DL (ref 13–17)
IMMATURE GRANULOCYTE COUNT: 0.07 X10(3) UL (ref 0–1)
IMMATURE GRANULOCYTE COUNT: 0.07 X10(3) UL (ref 0–1)
IMMATURE GRANULOCYTE RATIO %: 0.7 %
IMMATURE GRANULOCYTE RATIO %: 1.6 %
INR BLD: 0.97 (ref 0.9–1.1)
LACTIC ACID: 1.4 MMOL/L (ref 0.5–2)
LACTIC ACID: 3 MMOL/L (ref 0.5–2)
LYMPHOCYTE % MANUAL: 24 %
LYMPHOCYTE % MANUAL: 7 %
LYMPHOCYTE ABSOLUTE MANUAL: 0.86 X10(3) UL (ref 0.9–4)
LYMPHOCYTE ABSOLUTE MANUAL: 1.65 X10(3) UL (ref 0.9–4)
LYMPHOCYTES # BLD AUTO: 0.75 X10(3) UL (ref 0.9–4)
LYMPHOCYTES # BLD AUTO: 1.5 X10(3) UL (ref 0.9–4)
LYMPHOCYTES NFR BLD AUTO: 33.7 %
LYMPHOCYTES NFR BLD AUTO: 7 %
M PROTEIN MFR SERPL ELPH: 6.7 G/DL (ref 6.1–8.3)
M PROTEIN MFR SERPL ELPH: 6.8 G/DL (ref 6.1–8.3)
MCH RBC QN AUTO: 29.2 PG (ref 27–33.2)
MCH RBC QN AUTO: 29.5 PG (ref 27–33.2)
MCH RBC QN AUTO: 29.6 PG (ref 27–33.2)
MCH RBC QN AUTO: 29.8 PG (ref 27–33.2)
MCHC RBC AUTO-ENTMCNC: 31.3 G/DL (ref 31–37)
MCHC RBC AUTO-ENTMCNC: 31.5 G/DL (ref 31–37)
MCV RBC AUTO: 92.5 FL (ref 80–99)
MCV RBC AUTO: 94.4 FL (ref 80–99)
MCV RBC AUTO: 94.4 FL (ref 80–99)
MCV RBC AUTO: 95.1 FL (ref 80–99)
METAMYELOCYTE %: 10 %
METAMYELOCYTE ABSOLUTE MANUAL: 0.36 X10(3) UL (ref ?–0.01)
METAPNEUMOVIRUS PCR:: NEGATIVE
MONOCYTE % MANUAL: 2 %
MONOCYTE % MANUAL: 6 %
MONOCYTE ABSOLUTE MANUAL: 0.22 X10(3) UL (ref 0.1–1)
MONOCYTE ABSOLUTE MANUAL: 0.47 X10(3) UL (ref 0.1–1)
MONOCYTES # BLD AUTO: 0.09 X10(3) UL (ref 0.1–1)
MONOCYTES # BLD AUTO: 1.05 X10(3) UL (ref 0.1–1)
MONOCYTES NFR BLD AUTO: 0.8 %
MONOCYTES NFR BLD AUTO: 23.6 %
MORPHOLOGY: NORMAL
MYCOPLASMA PNEUMONIA PCR:: NEGATIVE
MYELOCYTE %: 13 %
MYELOCYTE ABSOLUTE MANUAL: 0.47 X10(3) UL (ref ?–0.01)
NEUTROPHIL ABS PRELIM: 1.62 X10 (3) UL (ref 1.3–6.7)
NEUTROPHIL ABS PRELIM: 1.82 X10 (3) UL (ref 1.3–6.7)
NEUTROPHIL ABS PRELIM: 20.18 X10 (3) UL (ref 1.3–6.7)
NEUTROPHIL ABS PRELIM: 9.64 X10 (3) UL (ref 1.3–6.7)
NEUTROPHIL ABSOLUTE MANUAL: 1.62 X10(3) UL (ref 1.3–6.7)
NEUTROPHIL ABSOLUTE MANUAL: 21.39 X10(3) UL (ref 1.3–6.7)
NEUTROPHILS # BLD AUTO: 1.82 X10(3) UL (ref 1.3–6.7)
NEUTROPHILS # BLD AUTO: 9.64 X10(3) UL (ref 1.3–6.7)
NEUTROPHILS % MANUAL: 35 %
NEUTROPHILS % MANUAL: 87 %
NEUTROPHILS NFR BLD AUTO: 40.9 %
NEUTROPHILS NFR BLD AUTO: 90 %
P AXIS: -15 DEGREES
P AXIS: 34 DEGREES
P-R INTERVAL: 128 MS
P-R INTERVAL: 182 MS
PARAINFLUENZA 1 PCR:: NEGATIVE
PARAINFLUENZA 2 PCR:: NEGATIVE
PARAINFLUENZA 3 PCR:: NEGATIVE
PARAINFLUENZA 4 PCR:: NEGATIVE
PLATELET # BLD AUTO: 152 10(3)UL (ref 150–450)
PLATELET # BLD AUTO: 174 10(3)UL (ref 150–450)
PLATELET # BLD AUTO: 202 10(3)UL (ref 150–450)
PLATELET # BLD AUTO: 91 10(3)UL (ref 150–450)
PLATELET MORPHOLOGY: NORMAL
POTASSIUM SERPL-SCNC: 4.6 MMOL/L (ref 3.6–5.1)
POTASSIUM SERPL-SCNC: 4.7 MMOL/L (ref 3.6–5.1)
PROCALCITONIN SERPL-MCNC: <0.11 NG/ML (ref ?–0.11)
PSA SERPL DL<=0.01 NG/ML-MCNC: 13.3 SECONDS (ref 12.4–14.7)
Q-T INTERVAL: 310 MS
Q-T INTERVAL: 412 MS
Q-T INTERVAL: 514 MS
QRS DURATION: 86 MS
QRS DURATION: 86 MS
QRS DURATION: 98 MS
QTC CALCULATION (BEZET): 472 MS
QTC CALCULATION (BEZET): 481 MS
QTC CALCULATION (BEZET): 521 MS
R AXIS: 2 DEGREES
R AXIS: 30 DEGREES
R AXIS: 40 DEGREES
RBC # BLD AUTO: 3.46 X10(6)UL (ref 3.8–5.8)
RBC # BLD AUTO: 4.15 X10(6)UL (ref 3.8–5.8)
RBC # BLD AUTO: 4.3 X10(6)UL (ref 3.8–5.8)
RBC # BLD AUTO: 4.43 X10(6)UL (ref 3.8–5.8)
RED CELL DISTRIBUTION WIDTH-SD: 55.9 FL (ref 35.1–46.3)
RED CELL DISTRIBUTION WIDTH-SD: 56.1 FL (ref 35.1–46.3)
RED CELL DISTRIBUTION WIDTH-SD: 56.7 FL (ref 35.1–46.3)
RED CELL DISTRIBUTION WIDTH-SD: 58.5 FL (ref 35.1–46.3)
RHINOVIRUS/ENTERO PCR:: NEGATIVE
RSV RNA SPEC QL NAA+PROBE: NEGATIVE
SODIUM SERPL-SCNC: 134 MMOL/L (ref 136–144)
SODIUM SERPL-SCNC: 135 MMOL/L (ref 136–144)
T AXIS: 59 DEGREES
T AXIS: 63 DEGREES
T AXIS: 66 DEGREES
TOTAL CELLS COUNTED: 100
TOTAL CELLS COUNTED: 100
TROPONIN: <0.046 NG/ML (ref ?–0.05)
TSI SER-ACNC: 8.79 MIU/ML (ref 0.35–5.5)
VENTRICULAR RATE: 145 BPM
VENTRICULAR RATE: 62 BPM
VENTRICULAR RATE: 79 BPM
WBC # BLD AUTO: 10.7 X10(3) UL (ref 4–13)
WBC # BLD AUTO: 23.5 X10(3) UL (ref 4–13)
WBC # BLD AUTO: 3.6 X10(3) UL (ref 4–13)
WBC # BLD AUTO: 4.5 X10(3) UL (ref 4–13)

## 2018-01-01 PROCEDURE — 71046 X-RAY EXAM CHEST 2 VIEWS: CPT | Performed by: INTERNAL MEDICINE

## 2018-01-01 PROCEDURE — 71045 X-RAY EXAM CHEST 1 VIEW: CPT | Performed by: INTERNAL MEDICINE

## 2018-01-01 PROCEDURE — 71045 X-RAY EXAM CHEST 1 VIEW: CPT | Performed by: EMERGENCY MEDICINE

## 2018-01-01 PROCEDURE — 36415 COLL VENOUS BLD VENIPUNCTURE: CPT

## 2018-01-01 PROCEDURE — 93005 ELECTROCARDIOGRAM TRACING: CPT

## 2018-01-01 PROCEDURE — 93010 ELECTROCARDIOGRAM REPORT: CPT

## 2018-01-01 PROCEDURE — 71275 CT ANGIOGRAPHY CHEST: CPT | Performed by: EMERGENCY MEDICINE

## 2018-01-01 PROCEDURE — 87486 CHLMYD PNEUM DNA AMP PROBE: CPT | Performed by: INTERNAL MEDICINE

## 2018-01-01 PROCEDURE — 96365 THER/PROPH/DIAG IV INF INIT: CPT

## 2018-01-01 PROCEDURE — 87633 RESP VIRUS 12-25 TARGETS: CPT | Performed by: INTERNAL MEDICINE

## 2018-01-01 PROCEDURE — 74177 CT ABD & PELVIS W/CONTRAST: CPT | Performed by: INTERNAL MEDICINE

## 2018-01-01 PROCEDURE — 83036 HEMOGLOBIN GLYCOSYLATED A1C: CPT | Performed by: INTERNAL MEDICINE

## 2018-01-01 PROCEDURE — 85610 PROTHROMBIN TIME: CPT | Performed by: EMERGENCY MEDICINE

## 2018-01-01 PROCEDURE — 70450 CT HEAD/BRAIN W/O DYE: CPT | Performed by: EMERGENCY MEDICINE

## 2018-01-01 PROCEDURE — 99291 CRITICAL CARE FIRST HOUR: CPT | Performed by: HOSPITALIST

## 2018-01-01 PROCEDURE — 5A09357 ASSISTANCE WITH RESPIRATORY VENTILATION, LESS THAN 24 CONSECUTIVE HOURS, CONTINUOUS POSITIVE AIRWAY PRESSURE: ICD-10-PCS | Performed by: HOSPITALIST

## 2018-01-01 PROCEDURE — 85025 COMPLETE CBC W/AUTO DIFF WBC: CPT | Performed by: INTERNAL MEDICINE

## 2018-01-01 PROCEDURE — 96367 TX/PROPH/DG ADDL SEQ IV INF: CPT

## 2018-01-01 PROCEDURE — 71275 CT ANGIOGRAPHY CHEST: CPT | Performed by: HOSPITALIST

## 2018-01-01 PROCEDURE — C9113 INJ PANTOPRAZOLE SODIUM, VIA: HCPCS | Performed by: EMERGENCY MEDICINE

## 2018-01-01 PROCEDURE — 85730 THROMBOPLASTIN TIME PARTIAL: CPT | Performed by: EMERGENCY MEDICINE

## 2018-01-01 PROCEDURE — 83605 ASSAY OF LACTIC ACID: CPT | Performed by: EMERGENCY MEDICINE

## 2018-01-01 PROCEDURE — 85025 COMPLETE CBC W/AUTO DIFF WBC: CPT | Performed by: EMERGENCY MEDICINE

## 2018-01-01 PROCEDURE — 87798 DETECT AGENT NOS DNA AMP: CPT | Performed by: INTERNAL MEDICINE

## 2018-01-01 PROCEDURE — 99285 EMERGENCY DEPT VISIT HI MDM: CPT

## 2018-01-01 PROCEDURE — 84439 ASSAY OF FREE THYROXINE: CPT | Performed by: EMERGENCY MEDICINE

## 2018-01-01 PROCEDURE — 93970 EXTREMITY STUDY: CPT | Performed by: INTERNAL MEDICINE

## 2018-01-01 PROCEDURE — 96375 TX/PRO/DX INJ NEW DRUG ADDON: CPT

## 2018-01-01 PROCEDURE — 71260 CT THORAX DX C+: CPT | Performed by: INTERNAL MEDICINE

## 2018-01-01 PROCEDURE — 87999 UNLISTED MICROBIOLOGY PX: CPT

## 2018-01-01 PROCEDURE — 80053 COMPREHEN METABOLIC PANEL: CPT | Performed by: EMERGENCY MEDICINE

## 2018-01-01 PROCEDURE — 3E0G76Z INTRODUCTION OF NUTRITIONAL SUBSTANCE INTO UPPER GI, VIA NATURAL OR ARTIFICIAL OPENING: ICD-10-PCS | Performed by: HOSPITALIST

## 2018-01-01 PROCEDURE — 97162 PT EVAL MOD COMPLEX 30 MIN: CPT

## 2018-01-01 PROCEDURE — 85027 COMPLETE CBC AUTOMATED: CPT | Performed by: EMERGENCY MEDICINE

## 2018-01-01 PROCEDURE — 92611 MOTION FLUOROSCOPY/SWALLOW: CPT

## 2018-01-01 PROCEDURE — 82565 ASSAY OF CREATININE: CPT

## 2018-01-01 PROCEDURE — 97116 GAIT TRAINING THERAPY: CPT

## 2018-01-01 PROCEDURE — 85007 BL SMEAR W/DIFF WBC COUNT: CPT | Performed by: EMERGENCY MEDICINE

## 2018-01-01 PROCEDURE — 97110 THERAPEUTIC EXERCISES: CPT

## 2018-01-01 PROCEDURE — 87581 M.PNEUMON DNA AMP PROBE: CPT | Performed by: INTERNAL MEDICINE

## 2018-01-01 PROCEDURE — 87040 BLOOD CULTURE FOR BACTERIA: CPT | Performed by: EMERGENCY MEDICINE

## 2018-01-01 PROCEDURE — 99291 CRITICAL CARE FIRST HOUR: CPT

## 2018-01-01 PROCEDURE — 92610 EVALUATE SWALLOWING FUNCTION: CPT

## 2018-01-01 PROCEDURE — 84484 ASSAY OF TROPONIN QUANT: CPT | Performed by: EMERGENCY MEDICINE

## 2018-01-01 PROCEDURE — 84145 PROCALCITONIN (PCT): CPT | Performed by: INTERNAL MEDICINE

## 2018-01-01 PROCEDURE — 83735 ASSAY OF MAGNESIUM: CPT | Performed by: EMERGENCY MEDICINE

## 2018-01-01 PROCEDURE — 84443 ASSAY THYROID STIM HORMONE: CPT | Performed by: EMERGENCY MEDICINE

## 2018-01-01 PROCEDURE — 74230 X-RAY XM SWLNG FUNCJ C+: CPT | Performed by: INTERNAL MEDICINE

## 2018-01-01 PROCEDURE — 96372 THER/PROPH/DIAG INJ SC/IM: CPT

## 2018-01-01 PROCEDURE — 0DH67UZ INSERTION OF FEEDING DEVICE INTO STOMACH, VIA NATURAL OR ARTIFICIAL OPENING: ICD-10-PCS | Performed by: HOSPITALIST

## 2018-01-01 RX ORDER — ARIPIPRAZOLE 15 MG/1
40 TABLET ORAL EVERY 4 HOURS
Status: COMPLETED | OUTPATIENT
Start: 2018-01-01 | End: 2018-01-01

## 2018-01-01 RX ORDER — METOPROLOL SUCCINATE 100 MG/1
100 TABLET, EXTENDED RELEASE ORAL
Status: DISCONTINUED | OUTPATIENT
Start: 2018-01-01 | End: 2018-01-01

## 2018-01-01 RX ORDER — AMIODARONE HYDROCHLORIDE 400 MG/1
400 TABLET ORAL 2 TIMES DAILY
Qty: 90 TABLET | Refills: 3 | Status: ON HOLD | OUTPATIENT
Start: 2018-01-01 | End: 2018-01-01

## 2018-01-01 RX ORDER — AMIODARONE HYDROCHLORIDE 200 MG/1
200 TABLET ORAL DAILY
Qty: 90 TABLET | Refills: 3 | Status: SHIPPED | OUTPATIENT
Start: 2018-01-01 | End: 2018-01-01

## 2018-01-01 RX ORDER — MORPHINE SULFATE 4 MG/ML
1 INJECTION, SOLUTION INTRAMUSCULAR; INTRAVENOUS EVERY 2 HOUR PRN
Status: DISCONTINUED | OUTPATIENT
Start: 2018-01-01 | End: 2018-01-01

## 2018-01-01 RX ORDER — FUROSEMIDE 10 MG/ML
40 INJECTION INTRAMUSCULAR; INTRAVENOUS ONCE
Status: COMPLETED | OUTPATIENT
Start: 2018-01-01 | End: 2018-01-01

## 2018-01-01 RX ORDER — HEPARIN SODIUM 5000 [USP'U]/ML
80 INJECTION INTRAVENOUS; SUBCUTANEOUS ONCE
Status: DISCONTINUED | OUTPATIENT
Start: 2018-01-01 | End: 2018-01-01

## 2018-01-01 RX ORDER — ENOXAPARIN SODIUM 100 MG/ML
1 INJECTION SUBCUTANEOUS EVERY 12 HOURS SCHEDULED
Qty: 34.2 ML | Refills: 0 | Status: SHIPPED | OUTPATIENT
Start: 2018-01-01 | End: 2018-01-01

## 2018-01-01 RX ORDER — KETOROLAC TROMETHAMINE 15 MG/ML
15 INJECTION, SOLUTION INTRAMUSCULAR; INTRAVENOUS EVERY 6 HOURS PRN
Status: DISPENSED | OUTPATIENT
Start: 2018-01-01 | End: 2018-01-01

## 2018-01-01 RX ORDER — ASPIRIN 325 MG
325 TABLET ORAL DAILY
Status: DISCONTINUED | OUTPATIENT
Start: 2018-01-01 | End: 2018-01-01

## 2018-01-01 RX ORDER — SENNA AND DOCUSATE SODIUM 50; 8.6 MG/1; MG/1
1 TABLET, FILM COATED ORAL DAILY
Status: ON HOLD | COMMUNITY
End: 2018-01-01

## 2018-01-01 RX ORDER — SODIUM CHLORIDE 9 MG/ML
INJECTION, SOLUTION INTRAVENOUS CONTINUOUS
Status: ACTIVE | OUTPATIENT
Start: 2018-01-01 | End: 2018-01-01

## 2018-01-01 RX ORDER — ONDANSETRON 2 MG/ML
4 INJECTION INTRAMUSCULAR; INTRAVENOUS EVERY 4 HOURS PRN
Status: DISCONTINUED | OUTPATIENT
Start: 2018-01-01 | End: 2018-01-01

## 2018-01-01 RX ORDER — DEXTROSE MONOHYDRATE 25 G/50ML
50 INJECTION, SOLUTION INTRAVENOUS
Status: DISCONTINUED | OUTPATIENT
Start: 2018-01-01 | End: 2018-01-01

## 2018-01-01 RX ORDER — METOPROLOL TARTRATE 5 MG/5ML
7.5 INJECTION INTRAVENOUS EVERY 6 HOURS
Status: DISCONTINUED | OUTPATIENT
Start: 2018-01-01 | End: 2018-01-01

## 2018-01-01 RX ORDER — HEPARIN SODIUM AND DEXTROSE 10000; 5 [USP'U]/100ML; G/100ML
INJECTION INTRAVENOUS CONTINUOUS
Status: CANCELLED | OUTPATIENT
Start: 2018-01-01

## 2018-01-01 RX ORDER — AMOXICILLIN AND CLAVULANATE POTASSIUM 875; 125 MG/1; MG/1
1 TABLET, FILM COATED ORAL 2 TIMES DAILY
Qty: 8 TABLET | Refills: 0 | Status: SHIPPED | OUTPATIENT
Start: 2018-01-01 | End: 2018-01-01

## 2018-01-01 RX ORDER — DOCUSATE SODIUM 100 MG/1
100 CAPSULE, LIQUID FILLED ORAL 2 TIMES DAILY
Status: DISCONTINUED | OUTPATIENT
Start: 2018-01-01 | End: 2018-01-01

## 2018-01-01 RX ORDER — AMIODARONE HYDROCHLORIDE 200 MG/1
400 TABLET ORAL DAILY
Status: DISCONTINUED | OUTPATIENT
Start: 2018-01-01 | End: 2018-01-01

## 2018-01-01 RX ORDER — FUROSEMIDE 10 MG/ML
40 INJECTION INTRAMUSCULAR; INTRAVENOUS DAILY
Status: DISCONTINUED | OUTPATIENT
Start: 2018-01-01 | End: 2018-01-01

## 2018-01-01 RX ORDER — AMIODARONE HYDROCHLORIDE 200 MG/1
200 TABLET ORAL DAILY
Status: DISCONTINUED | OUTPATIENT
Start: 2018-01-01 | End: 2018-01-01

## 2018-01-01 RX ORDER — METOPROLOL SUCCINATE 100 MG/1
100 TABLET, EXTENDED RELEASE ORAL DAILY
Status: DISCONTINUED | OUTPATIENT
Start: 2018-01-01 | End: 2018-01-01

## 2018-01-01 RX ORDER — ACETAMINOPHEN 10 MG/ML
1000 INJECTION, SOLUTION INTRAVENOUS EVERY 6 HOURS PRN
Status: DISCONTINUED | OUTPATIENT
Start: 2018-01-01 | End: 2018-01-01

## 2018-01-01 RX ORDER — MAGNESIUM SULFATE HEPTAHYDRATE 40 MG/ML
2 INJECTION, SOLUTION INTRAVENOUS ONCE
Status: COMPLETED | OUTPATIENT
Start: 2018-01-01 | End: 2018-01-01

## 2018-01-01 RX ORDER — KETOROLAC TROMETHAMINE 30 MG/ML
30 INJECTION, SOLUTION INTRAMUSCULAR; INTRAVENOUS ONCE
Status: COMPLETED | OUTPATIENT
Start: 2018-01-01 | End: 2018-01-01

## 2018-01-01 RX ORDER — METHYLPREDNISOLONE SODIUM SUCCINATE 40 MG/ML
40 INJECTION, POWDER, LYOPHILIZED, FOR SOLUTION INTRAMUSCULAR; INTRAVENOUS 2 TIMES DAILY
Status: DISCONTINUED | OUTPATIENT
Start: 2018-01-01 | End: 2018-01-01

## 2018-01-01 RX ORDER — DEXMEDETOMIDINE HYDROCHLORIDE 4 UG/ML
INJECTION, SOLUTION INTRAVENOUS CONTINUOUS
Status: DISCONTINUED | OUTPATIENT
Start: 2018-01-01 | End: 2018-01-01

## 2018-01-01 RX ORDER — METHYLPREDNISOLONE SODIUM SUCCINATE 125 MG/2ML
80 INJECTION, POWDER, LYOPHILIZED, FOR SOLUTION INTRAMUSCULAR; INTRAVENOUS EVERY 8 HOURS
Status: DISCONTINUED | OUTPATIENT
Start: 2018-01-01 | End: 2018-01-01

## 2018-01-01 RX ORDER — LORAZEPAM 2 MG/ML
0.5 INJECTION INTRAMUSCULAR EVERY 4 HOURS PRN
Status: DISCONTINUED | OUTPATIENT
Start: 2018-01-01 | End: 2018-01-01

## 2018-01-01 RX ORDER — ONDANSETRON 2 MG/ML
4 INJECTION INTRAMUSCULAR; INTRAVENOUS EVERY 6 HOURS PRN
Status: DISCONTINUED | OUTPATIENT
Start: 2018-01-01 | End: 2018-01-01

## 2018-01-01 RX ORDER — POTASSIUM CHLORIDE 14.9 MG/ML
20 INJECTION INTRAVENOUS ONCE
Status: COMPLETED | OUTPATIENT
Start: 2018-01-01 | End: 2018-01-01

## 2018-01-01 RX ORDER — ENOXAPARIN SODIUM 100 MG/ML
60 INJECTION SUBCUTANEOUS ONCE
Status: COMPLETED | OUTPATIENT
Start: 2018-01-01 | End: 2018-01-01

## 2018-01-01 RX ORDER — SENNA AND DOCUSATE SODIUM 50; 8.6 MG/1; MG/1
1 TABLET, FILM COATED ORAL DAILY
Status: DISCONTINUED | OUTPATIENT
Start: 2018-01-01 | End: 2018-01-01

## 2018-01-01 RX ORDER — ALBUTEROL SULFATE 2.5 MG/3ML
2.5 SOLUTION RESPIRATORY (INHALATION)
Status: DISCONTINUED | OUTPATIENT
Start: 2018-01-01 | End: 2018-01-01

## 2018-01-01 RX ORDER — POTASSIUM CHLORIDE 29.8 MG/ML
40 INJECTION INTRAVENOUS ONCE
Status: COMPLETED | OUTPATIENT
Start: 2018-01-01 | End: 2018-01-01

## 2018-01-01 RX ORDER — ENOXAPARIN SODIUM 100 MG/ML
1 INJECTION SUBCUTANEOUS EVERY 12 HOURS SCHEDULED
Status: DISCONTINUED | OUTPATIENT
Start: 2018-01-01 | End: 2018-01-01

## 2018-01-01 RX ORDER — MORPHINE SULFATE 4 MG/ML
2 INJECTION, SOLUTION INTRAMUSCULAR; INTRAVENOUS EVERY 2 HOUR PRN
Status: DISCONTINUED | OUTPATIENT
Start: 2018-01-01 | End: 2018-01-01

## 2018-01-01 RX ORDER — ACETAMINOPHEN 500 MG
1000 TABLET ORAL EVERY 6 HOURS PRN
Status: DISCONTINUED | OUTPATIENT
Start: 2018-01-01 | End: 2018-01-01

## 2018-01-01 RX ORDER — ENOXAPARIN SODIUM 100 MG/ML
1 INJECTION SUBCUTANEOUS ONCE
Status: COMPLETED | OUTPATIENT
Start: 2018-01-01 | End: 2018-01-01

## 2018-01-01 RX ORDER — POTASSIUM CHLORIDE 20 MEQ/1
40 TABLET, EXTENDED RELEASE ORAL EVERY 4 HOURS
Status: DISCONTINUED | OUTPATIENT
Start: 2018-01-01 | End: 2018-01-01

## 2018-01-01 RX ORDER — DILTIAZEM HYDROCHLORIDE 60 MG/1
60 TABLET, FILM COATED ORAL AS NEEDED
Status: DISCONTINUED | OUTPATIENT
Start: 2018-01-01 | End: 2018-01-01

## 2018-01-01 RX ORDER — KETOROLAC TROMETHAMINE 30 MG/ML
INJECTION, SOLUTION INTRAMUSCULAR; INTRAVENOUS
Status: COMPLETED
Start: 2018-01-01 | End: 2018-01-01

## 2018-01-01 RX ORDER — AMIODARONE HYDROCHLORIDE 200 MG/1
400 TABLET ORAL 2 TIMES DAILY WITH MEALS
Status: DISCONTINUED | OUTPATIENT
Start: 2018-01-01 | End: 2018-01-01

## 2018-01-01 RX ORDER — DILTIAZEM HYDROCHLORIDE 5 MG/ML
10 INJECTION INTRAVENOUS
Status: DISCONTINUED | OUTPATIENT
Start: 2018-01-01 | End: 2018-01-01

## 2018-01-01 RX ORDER — AMIODARONE HYDROCHLORIDE 200 MG/1
200 TABLET ORAL DAILY
Qty: 90 TABLET | Refills: 0 | Status: SHIPPED | OUTPATIENT
Start: 2018-01-01 | End: 2018-01-01

## 2018-01-01 RX ORDER — LEVOFLOXACIN 750 MG/1
750 TABLET ORAL DAILY
COMMUNITY
Start: 2018-01-01 | End: 2018-01-01

## 2018-01-01 RX ORDER — DILTIAZEM HYDROCHLORIDE 5 MG/ML
5 INJECTION INTRAVENOUS ONCE
Status: COMPLETED | OUTPATIENT
Start: 2018-01-01 | End: 2018-01-01

## 2018-01-01 RX ORDER — AMIODARONE HYDROCHLORIDE 200 MG/1
200 TABLET ORAL DAILY
Qty: 90 TABLET | Refills: 3 | Status: ON HOLD | OUTPATIENT
Start: 2018-01-01 | End: 2018-01-01

## 2018-01-01 RX ORDER — SODIUM CHLORIDE 9 MG/ML
INJECTION, SOLUTION INTRAVENOUS CONTINUOUS
Status: DISCONTINUED | OUTPATIENT
Start: 2018-01-01 | End: 2018-01-01

## 2018-01-01 RX ORDER — HEPARIN SODIUM AND DEXTROSE 10000; 5 [USP'U]/100ML; G/100ML
18 INJECTION INTRAVENOUS ONCE
Status: DISCONTINUED | OUTPATIENT
Start: 2018-01-01 | End: 2018-01-01

## 2018-01-01 RX ORDER — FUROSEMIDE 10 MG/ML
40 INJECTION INTRAMUSCULAR; INTRAVENOUS 3 TIMES DAILY
Status: COMPLETED | OUTPATIENT
Start: 2018-01-01 | End: 2018-01-01

## 2018-01-01 RX ORDER — MAGNESIUM OXIDE 400 MG (241.3 MG MAGNESIUM) TABLET
1 TABLET NIGHTLY PRN
Status: DISCONTINUED | OUTPATIENT
Start: 2018-01-01 | End: 2018-01-01

## 2018-01-01 RX ORDER — ENOXAPARIN SODIUM 100 MG/ML
60 INJECTION SUBCUTANEOUS EVERY 12 HOURS SCHEDULED
Status: DISCONTINUED | OUTPATIENT
Start: 2018-01-01 | End: 2018-01-01

## 2018-01-01 RX ORDER — ACETAMINOPHEN 325 MG/1
650 TABLET ORAL EVERY 6 HOURS PRN
Status: DISCONTINUED | OUTPATIENT
Start: 2018-01-01 | End: 2018-01-01

## 2018-01-02 ENCOUNTER — PRIOR ORIGINAL RECORDS (OUTPATIENT)
Dept: OTHER | Age: 78
End: 2018-01-02

## 2018-01-16 ENCOUNTER — PRIOR ORIGINAL RECORDS (OUTPATIENT)
Dept: OTHER | Age: 78
End: 2018-01-16

## 2018-01-29 ENCOUNTER — PRIOR ORIGINAL RECORDS (OUTPATIENT)
Dept: OTHER | Age: 78
End: 2018-01-29

## 2018-02-05 ENCOUNTER — PRIOR ORIGINAL RECORDS (OUTPATIENT)
Dept: OTHER | Age: 78
End: 2018-02-05

## 2018-02-19 ENCOUNTER — PRIOR ORIGINAL RECORDS (OUTPATIENT)
Dept: OTHER | Age: 78
End: 2018-02-19

## 2018-03-05 ENCOUNTER — PRIOR ORIGINAL RECORDS (OUTPATIENT)
Dept: OTHER | Age: 78
End: 2018-03-05

## 2018-03-07 ENCOUNTER — PRIOR ORIGINAL RECORDS (OUTPATIENT)
Dept: OTHER | Age: 78
End: 2018-03-07

## 2018-03-09 PROBLEM — J18.9 COMMUNITY ACQUIRED PNEUMONIA OF LEFT LUNG, UNSPECIFIED PART OF LUNG: Status: ACTIVE | Noted: 2018-01-01

## 2018-03-09 PROBLEM — D72.829 LEUKOCYTOSIS: Status: ACTIVE | Noted: 2018-01-01

## 2018-03-09 PROBLEM — R79.89 AZOTEMIA: Status: ACTIVE | Noted: 2018-01-01

## 2018-03-09 PROBLEM — R73.9 HYPERGLYCEMIA: Status: ACTIVE | Noted: 2018-01-01

## 2018-03-09 PROBLEM — E87.1 HYPONATREMIA: Status: ACTIVE | Noted: 2018-01-01

## 2018-03-09 PROBLEM — J18.9 COMMUNITY ACQUIRED PNEUMONIA OF LEFT LUNG: Status: ACTIVE | Noted: 2018-01-01

## 2018-03-09 NOTE — ED PROVIDER NOTES
Patient Seen in: [facility] Emergency Department    History   Patient presents with:  Dyspnea MAL SOB (respiratory)    Stated Complaint: sob    HPI    Patient is a 77-year-old male comes emergency room for evaluation of shortness of breath, weakness Location: [address] [place] [address]  4/28/17: OTHER SURGICAL HISTORY      Comment: cysto  6/27/2012: REMV CATARACT EXTRACAP,INSERT LENS      Comment: Procedure: RIGHT PHACOEMULSIFICATION OF                CATARACT WITH INTRAOCULAR LENS IMPLANT 50066; murmur. ABDOMEN: Bowel sounds are present. Soft. nondistended, no pulsatile masses. nontender    MUSCULOSKELETAL: No calf tenderness. Dorsalis and Posterior Tibial pulses present. No clubbing. No cyanosis. No edema.    SKIN EXAMINATIoN: Warm and dry with No acute changes. EKG #2 EKG  Rate, Axis and intervals as noted. I agree with computer interpretation.   Rate: 79  Rhythm: Sinus rhythm  Reading: No acute changes    [facility]      Sepsis Reassessment Note    /75   Pulse 71   Temp 98.1 3/9/18 8797)   azithromycin (ZITHROMAX) 500 mg in sodium chloride 0.9 % 250 mL IVPB (not administered)   DilTIAZem HCl (CARDIZEM) injection 5 mg (5 mg Intravenous Given 3/9/18 1254)   sodium chloride 0.9% IV bolus 1,000 mL (1,000 mL Intravenous New Bag 3/9 Medication List        Present on Admission  Date Reviewed: 11/6/2017          ICD-10-CM Noted POA    Azotemia R79.89 3/9/2018 Yes    Community acquired pneumonia of left lung J18.9 3/9/2018 Unknown    Hyperglycemia R73.9 3/9/2018 Yes    Hyponatremia E87. 1

## 2018-03-09 NOTE — H&P
[facility] Hospitalist History and Physical      Patient presents with:  Dyspnea MAL SOB (respiratory)       PCP: Reed Valle MD      History of Present Illness: Patient is a 78year old male with PMH sig for pafib, met colon ca undergoing chemo, ho small PE, wh [address]  7/18/2012: [address] [facility] CATARACT EXTRACAP,INSERT LENS      Comment: Procedure: LEFT PHACOEMULSIFICATION OF                CATARACT WITH INTRAOCULAR LENS IMPLANT 99736;                 Surgeon:  Meryle Guarneri, MD;  Location: [facility] 13.1 03/09/2018   HCT 41.8 03/09/2018   .0 03/09/2018   CREATSERUM 0.92 03/09/2018   BUN 25 03/09/2018    03/09/2018   K 4.7 03/09/2018    03/09/2018   CO2 25.0 03/09/2018    03/09/2018   CA 8.9 03/09/2018   ALB 2.5 03/09/2018   A middle lobe, and left lower lobe. These ground-glass opacities present now obscure the small ground-glass nodular areas present previously. No pleural effusion, pericardial effusion or pneumothorax. Mild aneurysmal dilation ascending aorta 4.0 cm.   Jama Millan Dictated by: Rick Kang MD on 3/02/2018 at 20:20     Approved by: Rick Kang MD            Xr Chest Ap Portable  (cpt=71045)    Result Date: 3/9/2018  PROCEDURE:  XR CHEST AP PORTABLE  (CPT=71045)  TECHNIQUE:  AP chest radiograph was obtained. in H+P. Based on patients current state of illness, I anticipate that, after discharge, patient will require TBD.

## 2018-03-09 NOTE — CONSULTS
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 Please do not hesitate to contact me if you have any questions.     Omid Carlisle MD, [facility]  3/9/2018  4:28 PM

## 2018-03-09 NOTE — ED INITIAL ASSESSMENT (HPI)
Pt here stating he is here for sob with an irregular heart beat onset 4d ago. Hx of a-fib colon cancer and PE. Denies chest pain.  C/o nausea

## 2018-03-10 NOTE — CONSULTS
[facility]  Report of Consultation    [address] Patient Status:  Inpatient    1940 MRN [id number]   [facility] 7NE-A Attending Pete Maurer, [address] Day # 1 PCP Felicitas Shore MD     Reason for Consultation:  [facility] POSSIBLE POLYPECTOMY 81248;  Surgeon: Dejah Bowen MD;  Location: [address] [place] [address]  4/28/17: OTHER SURGICAL HISTORY      Comment: cysto  6/27/2012: REMV CATARACT EXTRACAP,INSERT LENS      Comment: Procedure: RIGHT PHACOEMULSIFICATION height 5' 9\" (1.753 m), weight 110 lb 3.7 oz (50 kg), SpO2 90 %. General: Patient is alert and oriented x 3, not in acute distress.   Elderly appearing    Vital Signs: /88 (BP Location: Right arm)   Pulse 68   Temp 98.5 °F (36.9 °C) (Oral)   Resp 1 Only on ASA as high risk  5  Failed bedside swallow  6. Leukocytosis    PLAN:  1. Continue antibiotics  2. Video swallow later today  3. CPM per cards  4. CBC in am    Thank you for allowing me to participate in the care of your patient.     [place], DO

## 2018-03-10 NOTE — PLAN OF CARE
Patient alert and oriented. NSR on tele. Room air. Failed speech eval today, plan for VSS later today - passed. Tolerating diet. Oral meds restarted. Patient ambulated in halls three times today.        CARDIOVASCULAR - ADULT    • Maintains optimal cardiac

## 2018-03-10 NOTE — SLP NOTE
ADULT SWALLOWING EVALUATION    ASSESSMENT    ASSESSMENT/OVERALL IMPRESSION:  Order received for BSSE. Per chart review:  Patient is a 82-year-old male comes emergency room for evaluation of shortness of breath, weakness and palpitations.   Patient has histo until VFSS can be completed. Discussed results of evaluation and recommendations with pt who reported understanding. Discussed recommendations with RN.       Dick Assessment of Swallow Function Score: Mild    RECOMMENDATIONS   Diet Recommendations - Solids: Functional  Symmetry: Within Functional Limits  Strength:  Within Functional Limits  Tone: Within Functional Limits  Range of Motion: Within Functional Limits  Rate of Motion: Within Functional Limits    Voice Quality: Harsh/Strained     Consistencies Trial

## 2018-03-10 NOTE — PHYSICAL THERAPY NOTE
PHYSICAL THERAPY EVALUATION - INPATIENT     Room Number: 8206/0710-J  Evaluation Date: 3/10/2018  Type of Evaluation: Initial  Physician Order: PT Eval and Treat    Presenting Problem: SOB, irregular heart beat  Reason for Therapy: Mobility Dysfuncti MD OCTAVIA;  Location: [address] [place] [address]  7/18/2012: [facility] CATARACT EXTRACAP,INSERT LENS      Comment: Procedure: LEFT PHACOEMULSIFICATION OF                CATARACT WITH INTRAOCULAR LENS IMPLANT 91465;                 Surgeon:  Chau Stage sheets and blankets)?: A Little   -   Sitting down on and standing up from a chair with arms (e.g., wheelchair, bedside commode, etc.): A Little   -   Moving from lying on back to sitting on the side of the bed?: A Little   How much help from another perso postural deficits with functional activities and decreased tolerance.   Functional outcome measures completed include The AM-PAC '6-Clicks' Inpatient Basic Mobility Short Form was completed and this patient is demonstrating a 46.58% degree of impairment in

## 2018-03-10 NOTE — PLAN OF CARE
Assumed care at [address]. Patient A&Ox4. VSS, tele NSR HR in 60's. On room air  spO2 >95 denies SOB. Denies pain when asked. Portacath accessed per patient request.  NPO. SCDs in place. No s/s of distress. Will cont to monitor.     CARDIOVASCULAR - A

## 2018-03-10 NOTE — PLAN OF CARE
Problem: Impaired Swallowing  Goal: Minimize aspiration risk  Interventions:  NPO until VFSS completed. Outcome: Progressing      Comments: NPO.  VFSS.

## 2018-03-10 NOTE — PROGRESS NOTES
[facility] Cardiology  Progress Note    Smooth Beauford Bolus Patient Status:  Inpatient    1940 MRN [id number]   [facility] 7NE-A Attending Kaylin Carnes, [address] [facility] Day # 1 PCP Jeff Smith MD     Impression:  1.  PAF; not sodium chloride 0.9 % 250 mL IVPB 500 mg Intravenous Q24H   Piperacillin Sod-Tazobactam So (ZOSYN) 3.375 g in dextrose 5 % 100 mL ADD-vantage 3.375 g Intravenous Q8H   metoprolol Tartrate (LOPRESSOR) 5 MG/5ML injection 7.5 mg 7.5 mg Intravenous Q6H   DilTI

## 2018-03-10 NOTE — PLAN OF CARE
NURSING ADMISSION NOTE      Patient admitted via Cart  Oriented to room. Safety precautions initiated. Bed in low position. Call light in reach.     Patient's daughter expressed concerns of aspiration  Difficulties eating and drinking water with coug

## 2018-03-10 NOTE — VIDEO SWALLOW STUDY NOTE
ADULT VIDEOFLUOROSCOPIC SWALLOWING STUDY    Admission Date: 3/9/2018  Evaluation Date: 03/10/18  Radiologist: Dr. Isak Otero: Regular (choose softer items)  Diet Recommendations - Liquid:  Thin    Further F 2. 1 cm. Reason for Referral: R/O aspiration      Family/Patient Goals: To eat and drink    ASSESSMENT   DYSPHAGIA ASSESSMENT  Test completed in conjunction with Radiologist.  Patient Positioned: Upright;Midline;RONAL chair.   Patient Viewed: Lateral.  P wash effectively clears the residue.    Laryngeal Penetration: None  Tracheal Aspiration: None  Penetration Aspiration Scale Score: Score 1: Material does not enter airway       Overall Impression:   The videofluoroscopic swallowing suite was not available patient/family/caregiver will demonstrate understanding and implementation of aspiration precautions and swallow strategies independently over 1 session(s).     In Progress   Goal #3 Patient will perform resistive tongue exercises and Marguerite Maneuver x 10 t

## 2018-03-10 NOTE — PROGRESS NOTES
[facility] Hospitalist Progress Note                                                                   [address] [facility]  9/12/1940    SUBJECTIVE: doing well. Breathing better. Still coughing. able  - on asa soley given bleeding risk     Ho small peripheral PE  - asa as above     Hyponatremia  - IVF as above-- improved     Dysphagia  - speech to see, NPO for now - video swallow pending     SCDs     Harish [facility] Hospitalist  [phone]

## 2018-03-10 NOTE — DIETARY NOTE
[facility]    NUTRITION INITIAL ASSESSMENT    Pt meets malnutrition criteria.     CRITERIA FOR MALNUTRITION DIAGNOSIS:  Criteria for severe malnutrition diagnosis: chronic illness related to wt loss greater than 7.5% in 3 months, energy intake less ernesto 49.9 kg (110 lb)   03/10/18 0434 50 kg (110 lb 3.7 oz)     Wt Readings from Last 6 Encounters:  03/10/18 : 50 kg (110 lb 3.7 oz)  12/13/17 : 57.6 kg (127 lb)  12/04/17 : 58.1 kg (128 lb)  11/06/17 : 60.3 kg (133 lb)  09/19/17 : 59.9 kg (132 lb)  09/11/17 :

## 2018-03-11 NOTE — PLAN OF CARE
Assumed care at [address]. Patient A&Ox4. VSS, tele NSR, on room air. Denies SOB. Denies pain when asked. Drank entire ensure this evening, tolerated well. No s/s of distress. Will cont to monitor.     CARDIOVASCULAR - ADULT    • Maintains optimal cardiac

## 2018-03-11 NOTE — PLAN OF CARE
Patient alert and oriented. NSR on tele. No incidents of afib. Room air. Continue IV ab. Possible dc today. Port deaccessed per protocol. Patient discharged in stable condition by private car to home with daughter.  Patient and daughter updated and educated

## 2018-03-11 NOTE — PLAN OF CARE
CARDIOVASCULAR - ADULT    • Maintains optimal cardiac output and hemodynamic stability Adequate for Discharge    • Absence of cardiac arrhythmias or at baseline Adequate for Discharge        GASTROINTESTINAL - ADULT    • Maintains adequate nutritional [place]

## 2018-03-11 NOTE — PROGRESS NOTES
[facility] [address] [place] [address] Cardiology  Progress Note    CjluisMing Charmayne Seltzer Patient Status:  Inpatient    1940 MRN [id number]   [facility] 7NE-A Attending Lus Bloch, [address] [facility] Day # 2 PCP Reed Valle MD     Impression:  1.  PAF; not clubbing/cyanosis; moves all 4 extremities normally      Current Facility-Administered Medications:  Heparin Lock Flush 100 UNIT/ML lock flush 500 Units 5 mL Intravenous PRN   Metoprolol Succinate ER (Toprol XL) 24 hr tab 100 mg 100 mg Oral Daily Beta Bloc

## 2018-03-11 NOTE — PHYSICAL THERAPY NOTE
PHYSICAL THERAPY TREATMENT NOTE - INPATIENT    Room Number: 2212/6926-C     Session: 1  Number of Visits to Meet Established Goals: 5    Presenting Problem: SOB, irregular heart beat     History related to current admission: Pt with history of metastatic EXTRACAP,INSERT LENS      Comment: Procedure: LEFT PHACOEMULSIFICATION OF                CATARACT WITH INTRAOCULAR LENS IMPLANT 19936;                 Surgeon:  Gayatri Aggarwal MD;  Location: [address] [place] [address]  8/5/2011: UPPER GI ENDOS be mod I/SBa in his transfers and is amb s any use of an AD on on plain surface . Gt is slow and steady with slight SOB with good recovery with task. Amb ~ 600' on plain surface.  Performed standing and seated  exe after ambulation with seated rest between Goal #4 Pt to ascend/descend 2 steps with use of HR at Mod I   Goal #5     Goal #6     Goal Comments: Goals established on 3/10/2018                              all goals ongoing 3/11/2018'

## 2018-03-11 NOTE — PROGRESS NOTES
[facility]  Progress Note    [address] Patient Status:  Inpatient    1940 MRN [id number]   [facility] 7NE-A Attending Lus Bloch, DO   Hosp Day # 2 PCP Reed Valle MD     Subjective:  Sitting in chair  Ate break

## 2018-03-12 NOTE — HOME CARE LIAISON
PT[facility] DISCHARGED BEFORE TNL HAD OPPORTUNITY TO MEET PTNT AT BEDSIDE. TNL CALLED PT[facility] AT HOME AND DID NOT GET AN ANSWER. LEFT MESSAGE TO CALL ME IF INTERESTED IN HAVING HH. WILL FOLLOW UP.     Sami Sherman

## 2018-03-13 NOTE — DISCHARGE SUMMARY
General Medicine Discharge Summary     Patient ID:  Mary Grace Collado  78year old  9/12/1940    Admit date: 3/9/2018    Discharge date and time: 3/11/2018  4:33 PM     Attending Physician: Vonda Llanes 3:03 PM    START taking these medications    amiodarone HCl 400 MG Oral Tab  Take 1 tablet (400 mg total) by mouth 2 (two) times daily. , Normal, Disp-90 tablet, R-3Amiodarone 400mg twice a day for 7 days.  Then decrease to 400mg daily    Amoxicillin-Pot Cl

## 2018-03-19 ENCOUNTER — PRIOR ORIGINAL RECORDS (OUTPATIENT)
Dept: OTHER | Age: 78
End: 2018-03-19

## 2018-03-19 PROBLEM — I26.99 OTHER ACUTE PULMONARY EMBOLISM WITHOUT ACUTE COR PULMONALE (HCC): Status: ACTIVE | Noted: 2018-01-01

## 2018-03-19 PROBLEM — J18.9 COMMUNITY ACQUIRED PNEUMONIA, UNSPECIFIED LATERALITY: Status: ACTIVE | Noted: 2018-01-01

## 2018-03-19 PROBLEM — I26.99 PULMONARY EMBOLUS (HCC): Status: ACTIVE | Noted: 2018-01-01

## 2018-03-19 PROBLEM — I82.493 ACUTE DEEP VEIN THROMBOSIS (DVT) OF OTHER SPECIFIED VEIN OF BOTH LOWER EXTREMITIES (HCC): Status: ACTIVE | Noted: 2018-01-01

## 2018-03-19 NOTE — ED PROVIDER NOTES
Patient Seen in: [facility] Emergency Department    History   Patient presents with:  Deep Vein Thrombosis (cardiovascular)    Stated Complaint: pos dvt on ct    HPI    This is is a 44-year-old male who has a history of colon cancer.   The patient is Surgeon:  Meryle Guarneri, MD;  Location: [address] [place] [address]  7/18/2012: [facility] CATARACT EXTRACAP,INSERT LENS      Comment: Procedure: LEFT PHACOEMULSIFICATION OF                CATARACT WITH INTRAOCULAR LENS IMPLANT 76036; sensory exam is grossly normal.  And the patient is neurologically intact with no focal findings.          ED Course     Labs Reviewed   COMP METABOLIC PANEL (14) - Abnormal; Notable for the following:        Result Value    Glucose 201 (*)     Alkaline Chikis ED Course as of Mar 19 2301  ------------------------------------------------------------       MDM         The patient was placed on monitors, IV was started, blood was drawn.   Admission disposition: 3/19/2018  6:48 PM       The EKG shows perico TECHNIQUE:  IV contrast-enhanced multislice CT angiography is performed through the pulmonary arterial anatomy. 3D volume renderings are generated. Dose reduction techniques were used.  Dose information is transmitted to the ACR ([address] appropriate treatment is recommended to document resolution. 3.  Again identified are bilateral pulmonary masses representing metastatic disease. 4.  Stable large necrotic appearing hepatic mass.  5.  Wall thickening with surrounding inflammatory stranding CONCLUSION:  Acute deep venous thrombosis in the bilateral lower extremities as above. Critical results were discussed with Dr. Girma Encarnacion at approximately 32 61 16 hr on 3/19/2018. Critical results were read back.   Dictated by: Brodie Carranza MD on 3/19 Unknown

## 2018-03-20 NOTE — CONSULTS
Pulmonary H&P/Consult     NAME: [id number] [address]: [id number] - MRN: [id number] - Age: 78year old - :  1940    Date of Admission: 3/19/2018  3:57 PM  Admission Diagnosis: Acute deep vein thrombosis (DVT) of other specified vein of both colon cancer - with lung mets that looks stable compared to CT from 10 days ago  7.  Dispo - keep NPO  -bronch this afternoon for BAL - Dr. Dany Youngblood to do - discussed  -hold afternoon lovenox dosing  -discussed with patient and dtr Dr. Mitch Kanner    Thank yo HISTORY      Comment: cysto  6/27/2012: [facility] CATARACT EXTRACAP,INSERT LENS      Comment: Procedure: RIGHT PHACOEMULSIFICATION OF                CATARACT WITH INTRAOCULAR LENS IMPLANT 17752;                 Surgeon:  Julio Chavez MD;  Location: [facility] normal    LABS/STUDIES: Reviewed in [facility]  Recent Labs   Lab  03/19/18   1622   RBC  4.30   HGB  12.7*   HCT  40.6   MCV  94.4   MCH  29.5   MCHC  31.3   RDW  17.2*   NEPRELIM  1.62   WBC  3.6*   PLT  174.0     Recent Labs   Lab  03/19/18   1622   GLU  201*

## 2018-03-20 NOTE — H&P
[facility] Hospitalist History and Physical      Patient presents with:  Deep Vein Thrombosis (cardiovascular)       PCP: Chai Patel MD      History of Present Illness: Patient is a 78year old male with PMH sig for metastatic colon cancer, p A fib, hx of small LENS      Comment: Procedure: RIGHT PHACOEMULSIFICATION OF                CATARACT WITH INTRAOCULAR LENS IMPLANT 06174;                 Surgeon:  Valeriano Willard MD;  Location: [address] [place] [address]  7/18/2012: [place] [address] BUN 17 03/19/2018    03/19/2018   K 4.6 03/19/2018   CL 97 03/19/2018   CO2 29.0 03/19/2018    03/19/2018   CA 8.6 03/19/2018   ALB 2.8 03/19/2018   ALKPHO 122 03/19/2018   BILT 0.4 03/19/2018   TP 6.8 03/19/2018   AST 22 03/19/2018   ALT 17 (55355)    Result Date: 3/19/2018  PROCEDURE:  CT BRAIN OR HEAD (62000)  COMPARISON:  None. INDICATIONS:  pos dvt on ct  TECHNIQUE:  Noncontrast CT scanning is performed through the brain. Dose reduction techniques were used.  Dose information is transmitt ultrasound. Patient with bilateral lower extremity x 2-3 weeks. History of colon cancer with mets to liver.    CONTRAST USED:  55cc of Omnipaque 350  FINDINGS:  VASCULATURE:  Subtle filling defect identified within the left lower lobe on series 4, image 154 examination. 7. This critical value result was called to Dr. Perla Arevalo on 3/19/2018 at 6:42 p.m. Nancy Buckner Results read back was performed.     Dictated by: Eugenie Hyde MD on 3/19/2018 at 18:30     Approved by: Eugenie Hyde MD            Us Venous Doppler Dena Mcdowell performed in cooperation with the speech pathologist.  Barium of varying consistencies was administered orally with patient in lateral projection. COMPARISON:  None.   INDICATIONS:  r/o aspiration, define pharyngeal swallow  PATIENT STATED HISTORY: (As tra dimension. Appears also increased in transverse dimension. No new mass or nodule, but there is relatively extensive new ground-glass opacity present in the left upper lobe, to a lesser extent right upper lobe, middle lobe, and left lower lobe.    These g in the left upper lobe but also other areas. Large necrotic appearing hepatic  masses, with mild decrease in the size of the upper larger mass, right hepatic lobe. Stable necrotic-appearing retroperitoneal mass.    Dictated by: Tamiko Rodgers MD on 3/02/ lack of hypoxia.  Amiodarone adjusted per cardiology    A fib  -On reduced dose of amoidarone per cards  -BB, ASA  -Now on lovenox    Metastatic colon cancer  -Oncology following, appreciate    Anemia/leukopenia  -2/2 malignancy/chemo    Hyponatremia  -Mild

## 2018-03-20 NOTE — DIETARY MALNUTRITION NOTE
[facility]    NUTRITION INITIAL ASSESSMENT    Pt meets severe malnutrition criteria.     CRITERIA FOR MALNUTRITION DIAGNOSIS:  Criteria for severe malnutrition diagnosis: chronic illness related to wt loss greater than 7.5% in 3 months, energy intake l He does complain of shortness of breath with activity over the weekend but presently not short of breath. ANTHROPOMETRICS:  Ht: 175.3 cm (5' 9\")  Wt: 55 kg (121 lb 4.1 oz) . This is 76 % of IBW  BMI:Body mass index is 17.91 kg/m².  .  IBW: 72.7 kg  Us

## 2018-03-20 NOTE — CONSULTS
[facility]    PATIENT'S NAME: Jose C Barron   ATTENDING PHYSICIAN: Roxie Rdz MD   CONSULTING PHYSICIAN: Cassie Carranza M.D.    PATIENT ACCOUNT#:   [de-identified]    LOCATION:  [address] [place] [address]  MEDICAL RECORD #:   [id number]       DATE chemotherapy. He has not kept his followup until he saw me after his metastatic disease was diagnosed.   His medical history is otherwise significant for essential hypertension, on metoprolol 50 mg daily; benign prostatic hypertrophy,on finasteride 1 a day profile revealed BUN of 17, creatinine 0.91, alkaline phosphatase 122, SGOT 22, SGPT 17, bilirubin 0.4, total protein 6.8, albumin 2.8, blood sugar 201, sodium 134, potassium 4.6.       CT angiogram done in the ER showed presence of small filling defect in be completed tomorrow. Hopefully, he will show response to irinotecan. Thank you.     Dictated By Celestino Abernathy M.D.  d: 03/20/2018 09:31:08  t: 03/20/2018 09:57:21  [facility] [id number]  FBB/

## 2018-03-20 NOTE — PROGRESS NOTES
Had more discussions since my original consult with Jonny Martinez who also came to see the patient with me again, Dr. Tere Cerda, and Dr. Isis Levin. The patient feels better and currently without clear signs of infection.   The decision was made to hold o

## 2018-03-20 NOTE — PLAN OF CARE
Received patient from ED in stable condition. AOx4, VSS on RA  NSR/SB per tele  Denies pain. Non-pitting edema to lower extremities. Pulses palpable. Right chest portacath w/ chemotherapy infusing. Charge RN and House supervisor aware.  Med/Onc RNs can as

## 2018-03-20 NOTE — CONSULTS
[facility] Cardiology  Consultation Note      LaurieSaiabimael Willard Patient Status:  Inpatient    1940 MRN [id number]   [facility] 7NE-A Attending Troy Muro MD   Hosp Day # 1 PCP Adiel Vazquez MD     Outpatient cardiolog involvement. Small peripheral PEs were noted at that time. We had a long discussion regarding anticoagulation ([id number]= 3), we decided to go with aspirin due to the high risk of metastatic bleeding complications on AC.  He was admitted earlier this rambo surgery - left 04/22/2017, 10/26/2017       Past Surgical History:  No date: COLONOSCOPY      Comment: (9/09), colon CA  8/5/2011: COLONOSCOPY,BIOPSY      Comment: Performed by Yenifer Price at [address] [place] [address],[address]  12/16/2014: COLONOSCOPY, Exam:  Blood pressure 151/90, pulse 64, temperature (!) 97.5 °F (36.4 °C), temperature source Oral, resp. rate 17, height 5' 9\" (1.753 m), weight 121 lb 4.1 oz (55 kg), SpO2 97 %.   Temp (24hrs), Av.6 °F (36.4 °C), Min:97.3 °F (36.3 °C), Max:97.8 °F (3 participate in the care of your patient. Please do not hesitate to contact me if you have any questions.     Britta Petty MD  3/20/2018  2:27 PM

## 2018-03-21 ENCOUNTER — PRIOR ORIGINAL RECORDS (OUTPATIENT)
Dept: OTHER | Age: 78
End: 2018-03-21

## 2018-03-21 NOTE — PROGRESS NOTES
[facility]  Progress Note    [address] Patient Status:  Inpatient    1940 MRN [id number]   [facility] 7NE-A Attending Narcisa Meier MD   [facility] Day # 2 PCP Heidi Youngblood MD     Chief complaint: Stable. Status quo.     Obj Community acquired pneumonia, unspecified laterality     Acute deep vein thrombosis (DVT) of other specified vein of both lower extremities (HCC)        Plan:  Continue Lovenox as ordered q 12 hrs. Hopefully home today if OK with Dr Krystal Velasquez.   Fili Mcgarry

## 2018-03-21 NOTE — CM/SW NOTE
RX was faxed to pt pharmacy 3/20. Spoke with Deborah 23-14-20-09 who states they only have one syringe in stock. They are expecting the rest of the shipment today after 2:00pm.  Cm spoke with [facility] from [facility] [phone]. Lovenox cost is $10.00.

## 2018-03-21 NOTE — PLAN OF CARE
Assumed care at [address]. Pt A/O x4. RA. NSR/SB on tele. VSS. Pt denies any pain. R Port with chemo infusing. Meds given per MAR. Call light within reach. Will continue to monitor.     CARDIOVASCULAR - ADULT    • Maintains optimal cardiac output and hemodynam

## 2018-03-21 NOTE — PROGRESS NOTES
[address] Cardiology  Progress Note    Smooth Clark Patient Status:  Inpatient    1940 MRN [id number]   [facility] 7NE-A Attending Lizeth Chowdary MD   Hosp Day # 2 PCP Marilyn Yanez MD     Outpatient cardiologist: 650 ml   Output                0 ml   Net              650 ml     Wt Readings from Last 3 Encounters:  03/21/18 : 121 lb 4.8 oz (55 kg)  03/10/18 : 110 lb 3.7 oz (50 kg)  12/13/17 : 127 lb (57.6 kg)      General: Awake and alert; in no acute distress

## 2018-03-21 NOTE — PLAN OF CARE
NURSING DISCHARGE NOTE    Discharged Home via Wheelchair. Accompanied by Support staff  Belongings Taken by patient/family. VSS. Alert and oriented. Discharge instrustions given. Verbalized understanding.  IV and PAC removed without s/s of complicatio

## 2018-03-21 NOTE — PLAN OF CARE
Patient's lovenox is available for  at preferred pharmacy location. Daughter is bringing his keys at 2p. Will proceed to discharge then.

## 2018-03-21 NOTE — PLAN OF CARE
CARDIOVASCULAR - ADULT    • Maintains optimal cardiac output and hemodynamic stability Progressing        DISCHARGE PLANNING    • Discharge to home or other facility with appropriate resources Progressing        Patient/Family Goals    • Patient/Family Mir

## 2018-03-21 NOTE — PLAN OF CARE
CARDIOVASCULAR - ADULT    • Maintains optimal cardiac output and hemodynamic stability Adequate for Discharge        DISCHARGE PLANNING    • Discharge to home or other facility with appropriate resources Adequate for Discharge        Patient/Family Goals

## 2018-03-21 NOTE — DISCHARGE SUMMARY
General Medicine Discharge Summary     Patient ID:  Frankie Melvin  78year old  9/12/1940    Admit date: 3/19/2018    Discharge date and time: 3/21/18    Attending Physician: Donna Liriano MD      Hospital course:    78year old male with PMH sig for metastatic colon cancer, p A fib, hx of small PE, HTN who presents to [facility] after outpatient US showed bilateral lower extremity DVT     Acute DVT  -Appreciate pulm and hematology consultants  - total) by mouth daily.       STOP taking these medications    aspirin 325 MG Oral Tab          Activity: activity as tolerated  Diet: cardiac diet  Wound Care: as directed  Code Status: Full Code      Exam on day of discharge:      03/21/18  0930   BP: 149/

## 2018-03-21 NOTE — PLAN OF CARE
Alert and oriented. Some rhonchi noted to bilat LS. VSS. PPP. Edema minimal to left foot. Appetite fair. IV intact. PAC and chemo infusion without complications. Had BM today without straining. Sat up in chair for meals. Denies pain.

## 2018-03-21 NOTE — PROGRESS NOTES
Pulmonary Progress Note      NAME: [id number] [address]: [id number] - MRN: [id number] - Age: 78year old - : 1940    Assessment/Plan:  1. Shortness of breath - improved and on RA  2.  Pneumonia - recent aspiration PNA treated with Zosyn  -no tenderness or deformity. Heart: Regular rate and rhythm, normal S1S2, nomurmur. Abdomen: soft, non-tender, non-distended, positive BS.    Extremity: no edema   Skin: no new rash   Neuro: normal    Recent Labs   Lab  03/21/18   0503   RBC  4.15   HGB  12

## 2018-03-22 ENCOUNTER — PRIOR ORIGINAL RECORDS (OUTPATIENT)
Dept: OTHER | Age: 78
End: 2018-03-22

## 2018-03-23 ENCOUNTER — PRIOR ORIGINAL RECORDS (OUTPATIENT)
Dept: OTHER | Age: 78
End: 2018-03-23

## 2018-04-02 ENCOUNTER — PRIOR ORIGINAL RECORDS (OUTPATIENT)
Dept: OTHER | Age: 78
End: 2018-04-02

## 2018-04-03 ENCOUNTER — PRIOR ORIGINAL RECORDS (OUTPATIENT)
Dept: OTHER | Age: 78
End: 2018-04-03

## 2018-04-04 ENCOUNTER — PRIOR ORIGINAL RECORDS (OUTPATIENT)
Dept: OTHER | Age: 78
End: 2018-04-04

## 2018-04-06 ENCOUNTER — PRIOR ORIGINAL RECORDS (OUTPATIENT)
Dept: OTHER | Age: 78
End: 2018-04-06

## 2018-04-06 LAB
ALBUMIN: 2.8 G/DL
ALKALINE PHOSPHATATE(ALK PHOS): 122 IU/L
BUN: 17 MG/DL
CALCIUM: 8.6 MG/DL
CHLORIDE: 97 MEQ/L
CREATININE, SERUM: 0.91 MG/DL
GLUCOSE: 201 MG/DL
HEMATOCRIT: 38.4 %
HEMOGLOBIN: 12.1 G/DL
MCH: 29.2 PG
MCHC: 31.5 G/DL
MCV: 92.5 FL
PLATELETS: 202 K/UL
POTASSIUM, SERUM: 4.6 MEQ/L
PROTEIN, TOTAL: 6.8 G/DL
RED BLOOD COUNT: 4.15 X 10-6/U
SGOT (AST): 22 IU/L
SGPT (ALT): 17 IU/L
SODIUM: 134 MEQ/L
WHITE BLOOD COUNT: 4.5 X 10-3/U

## 2018-04-09 PROBLEM — C18.9 COLON CANCER METASTASIZED TO LUNG (HCC): Status: ACTIVE | Noted: 2018-01-01

## 2018-04-09 PROBLEM — J18.9 SEPSIS DUE TO PNEUMONIA (HCC): Status: ACTIVE | Noted: 2018-01-01

## 2018-04-09 PROBLEM — E87.5 HYPERKALEMIA: Status: ACTIVE | Noted: 2018-01-01

## 2018-04-09 PROBLEM — A41.9 SEPSIS DUE TO PNEUMONIA (HCC): Status: ACTIVE | Noted: 2018-01-01

## 2018-04-09 PROBLEM — C78.00 COLON CANCER METASTASIZED TO LUNG (HCC): Status: ACTIVE | Noted: 2018-01-01

## 2018-04-09 NOTE — ED INITIAL ASSESSMENT (HPI)
Patient presents with fever at home, currently on chemo and last treatment was last week Monday, Tuesday, Wednesday. He had diarrhea last week, soft stool yesterday, and no stool yet today. Diminished appetite. Denies dysuria.  Per daughter he normally has

## 2018-04-10 NOTE — PROGRESS NOTES
[facility] dosing service    Initial Pharmacokinetic Consult for Vancomycin Dosing     Smooth Asher is a 78year old male who is being treated for pneumonia.   Pharmacy has been asked to dose Vancomycin by Dr. Norma Brewer    He is allergic to benadr 750 mg Q 12 hours  based upon, 54.9kg weight, renal function, and pharmacokinetics. 2.  Pharmacy ordered Vancomycin trough level(s) prior to 4th dose. Goal trough level 15-20 ug/mL.     3.  Pharmacy will need BUN/Scr daily while on Vancomycin to assess

## 2018-04-10 NOTE — PROGRESS NOTES
[facility] Dosing Service  Antibiotic Dosing    Smooth Gastelum is a 78year old male for whom pharmacy is dosing Zosyn for treatment of  pneumonia    Allergies: is allergic to benadryl [diphenhydramine].     Vitals: /76 (BP Location: Right ar

## 2018-04-10 NOTE — SLP NOTE
ADULT SWALLOWING EVALUATION    ASSESSMENT    ASSESSMENT/OVERALL IMPRESSION:  B/S swallow eval completed upon receipt of MD order. Per MD order  HPI: Patient presents with fever and low oxygen saturations.   The patient is on chemotherapy for metastatic col after small sips thin, puree or cracker. Vocal quality remained clear throughout eval.  No c/o globus sensation. I reviewed aspiration precautions with the pt.   90 degree upright, small single sips thin from cup/no straws, alternate solids with liquids Diet Recommendations - Solids: Regular (choose softer foods and purees)  Diet Recommendations - Liquid: Thin (small single sips. No straws)  Aspiration Precautions: Upright position; Slow rate;Small bites and sips; No straw  Medication Administration Rigo (room air)  Consistencies Trialed:  Thin liquids;Puree;Hard solid  Method of Presentation: Self presentation;Spoon;Cup;Single sips  Patient Positioning: Upright    Oral Phase of Swallow:  (pt c/o food sticking in his teeth)       Pharyngeal Phase of Swallow

## 2018-04-10 NOTE — PROGRESS NOTES
04/10/18 1140   Clinical Encounter Type   Visited With Patient   Patient's Supportive Strategies/Resources Patient finds support through his [address] [facility]. Referral To ( received referral for AD consult.   Patient stated he has been in the h

## 2018-04-10 NOTE — CERTIFICATION
**Certification    PHYSICIAN Certification of Need for Inpatient Hospitalization    Based on the his current state of illness, Smooth Shook requires inpatient hospitalization for his PNA.      This requires inpatient medical treatment because the patient

## 2018-04-10 NOTE — CONSULTS
[facility]    PATIENT'S NAME: Jonnathan WHEELER   ATTENDING PHYSICIAN: Caitlyn Bowman. Elly Medina M.D.   Cesar Coup: Glynn Arrieta M.D.    PATIENT ACCOUNT#:   [de-identified]    LOCATION:  [address] [place] [address]  MEDICAL RECORD #:   [id number] Essential hypertension, on metoprolol; benign prostatic hypertrophy, on finasteride. No history of coronary artery disease.     PAST SURGICAL HISTORY:  Retinal detachment of the left eye needing surgery at [place], laser surgery on right 11,240, hemoglobin 12.8, platelet count 135,315. Repeat CBC today reveals white count 5800, hemoglobin 9.1, platelet count 225,222. Chemistry profile reveals BUN of 14, creatinine 0.68, calcium 7.4, magnesium 2.0. Lactic acid was normal at 1.5.   Procalc

## 2018-04-10 NOTE — DIETARY MALNUTRITION NOTE
[facility]    NUTRITION INITIAL ASSESSMENT    Pt meets severe malnutrition criteria.     CRITERIA FOR MALNUTRITION DIAGNOSIS:  Criteria for severe malnutrition diagnosis: chronic illness related to wt loss greater than 7.5% in 3 months, energy intake l week but it seems to have slowed down. He has not been eating or drinking much. He has had a chronic cough since having aspiration pneumonia about 6 weeks ago. The last few days it seemed like his coughing was better.   He was diagnosed 4 weeks ago with

## 2018-04-10 NOTE — ED NOTES
Patient had episode of incontinence, brief changed and yahir-care performed. Transport here for patient.

## 2018-04-10 NOTE — H&P
[facility] hospitalist H+P  PCP;Preet Drew MD  CC fever  HPI 79 yo male with multiple medical problems including but not limited to metastatic colon cancer, s/p recent chemo here with diarrhea (now improved), chronic cough, fever.  In ER diagnosed with sepsis du [address];                 Surgeon: Meryle Guarneri, MD;  Location: [address] [place] [address]  8/5/2011: UPPER GI ENDOSCOPY,BIOPSY      Comment: Performed by Anice Sugey at [address]  History reviewed.  No pertinent fam and Vanco IV  Swallow eval done, regular solids and thin liquids    A-fib with RVR: diltiazem IV drip, metoprolol, amiodarone  Patient is on lovenox  Cardiology consulted  Monitor on telemetry    Metastatic colon cancer, anemia, thrombocytopenia  Oncology

## 2018-04-10 NOTE — CM/SW NOTE
04/10/18 1600   CM/SW Referral Data   Referral Source    Reason for Referral Discharge planning   Informant Patient   Readmission Assessment   Factors that patient feels contributed to this readmission Other (only choose if nothing else appl discharge needs. He lives with his daughter( who is an oncologist)  and 4 granddaughters. 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 Mr Nelly Wilson states he is independent with ADL's and drives.  His daughter is getting him a

## 2018-04-10 NOTE — PLAN OF CARE
Admitted to [address] [place] [address] at [phone]. Pt A/O x4. O2 saturation 93 on 2L O2. Controlled Afib/NSR on tele. VSS. Paged Dr. Mark Phillip for admitting orders. Call light within reach. Will continue to monitor.

## 2018-04-10 NOTE — CONSULTS
[facility] Cardiology  Consultation Note      LaurieSai Thomasnford Beaumeg Patient Status:  Inpatient    1940 MRN [id number]   [facility] 7NE-A Attending Mohit Garner MD   Hosp Day # 1 PCP Sunitha Coffey MD     Outpatient card communicating with his daughter, Ifeanyi Guzman, who notes significant dyspnea on exertion and resultant tachycardia. Repeat TTE was essentially unchanged, preserved LV systolic function, moderate TR, no pericardial effusion.   He received three doses o range since 1995, bx negative   • Helicobacter pylori 4/8/0107   • History of colon cancer     s/p partial colectomy (10/09)   • Hypertension    • Personal history of antineoplastic chemotherapy    • Retinal detachment     left eye, surgery 4/26/2017   • V throat, and face: negative for epistaxis  Respiratory: negative for dyspnea on exertion  Cardiovascular: negative for chest pain  Gastrointestinal: negative for melena  Genitourinary:negative for hematuria  Hematologic/lymphatic: negative for bleeding  Mus 04/10/2018    04/10/2018   CA 7.4 04/10/2018   ALB 2.8 04/09/2018   ALKPHO 133 04/09/2018   BILT 0.7 04/09/2018   TP 7.7 04/09/2018   AST 47 04/09/2018   ALT 19 04/09/2018   MG 2.0 04/10/2018         Thank you for allowing our practice to participat

## 2018-04-10 NOTE — PLAN OF CARE
Assumed care at 0700. Patient alert and oriented. Denies any complaint of pain. Vitals stable, Afib per tele, heart rate 80-90's. On iv antibiotics. Up to chair for meals, up with one assist. Continent of bowel/bladder.  Updated on plan of care, verbalized

## 2018-04-10 NOTE — ED PROVIDER NOTES
Patient Seen in: [facility] Emergency Department    History   Patient presents with:  Fever (infectious)    Stated Complaint: ha, fever    HPI    Patient presents with fever and low oxygen saturations.   The patient is on chemotherapy for metastatic Comment: Procedure: COLONOSCOPY, POSSIBLE BIOPSY,                POSSIBLE POLYPECTOMY 25749;  Surgeon: Jeevan Cason MD;  Location: [address] [place] [address]  4/28/17: OTHER SURGICAL HISTORY      Comment: cysto  6/27/2012: [address] slightly irregular. Respiratory: Lungs clear to auscultation. Abdomen: Soft, nontender, no rebound or guarding, normal active bowel sounds, no CVA tenderness. Extremities: No CCE. Skin: Warm and dry.   Neurologic: Nonfocal.    ED Course     Labs Reviewe 3/19/2018  PROCEDURE:  CT BRAIN OR HEAD (06991)  COMPARISON:  None. INDICATIONS:  pos dvt on ct  TECHNIQUE:  Noncontrast CT scanning is performed through the brain. Dose reduction techniques were used.  Dose information is transmitted to the ACR (American bilateral lower extremity x 2-3 weeks. History of colon cancer with mets to liver.    CONTRAST USED:  55cc of Omnipaque 350  FINDINGS:  VASCULATURE:  Subtle filling defect identified within the left lower lobe on series 4, image 154 suggestive of an acute p result was called to Dr. Nolberto Foley on 3/19/2018 at 6:42 p.m. Vannessa Manifold Results read back was performed.     Dictated by: Taj Abreu MD on 3/19/2018 at 18:30     Approved by: Taj Abreu MD            Us Venous Doppler Leg Bilat - Dia Im (cpt=93970)    Re DEENA , CT ANGIOGRAPHY, CHEST (CPT=71275), 3/19/2018, 18:08. DEENA , XR CHEST PA + LAT CHEST (NYR=87138), 3/21/2018, 7:15.   INDICATIONS:  ha, fever  PATIENT STATED HISTORY: (As transcribed by Technologist)  Patient here with fever, currently getting carlita Piperacillin Sod-Tazobactam So (ZOSYN) 3.375 g in dextrose 5 % 100 mL ADD-vantage (0 g Intravenous Stopped 4/9/18 2059)   sodium chloride 0.9% IV bolus 1,000 mL (0 mL Intravenous Stopped 4/9/18 2151)     Patient was started on IV fluids as he appeared we 10:21 PM    Cardiac:  Regularity: Irregular  Rate: Normal  Heart Sounds: S1,S2    Lungs:   Right: Rales  Left: Rales    Peripheral Pulses:  Radial: Right 2+ or Left 2+      Capillary Refill:  <3 Secs    Skin:  Temp/Moisture: Warm and Dry  Color: [place]

## 2018-04-10 NOTE — PROGRESS NOTES
[facility] Note:  Renal Adjustment for vancomycin (VANCOCIN)    Smooth Sarmiento is a 78year old male who has been prescribed vancomycin (VANCOCIN) 1000 mg x1 in ER.   CrCl is estimated creatinine clearance is 45.1 mL/min (based on SCr of 1.1 mg/dL)

## 2018-04-11 NOTE — PROGRESS NOTES
Assumed patient care at 0730. Patient alert and oriented x4. No pain reported. On Bipap, o2 saturations mid 90's. Cardizem gtt stopped nightshift. Converted to aflutter at [facility] 51. HR conrolled, 80's -90's. Cardiology aware. BP stable.    Daughter updated,

## 2018-04-11 NOTE — PLAN OF CARE
Assumed patient care at [address] [place] [address]. Pt A&Ox4. Breathing requirements increased throughout shift from RA to BiPaP. Discussed throughout the night with Dr. Hanna Bonds. CTA chest obtained. 40 IV lasix x1. Nebs and robitussin ordered.  However, did call RRT prior

## 2018-04-11 NOTE — CONSULTS
Pulmonary / Critical Care H&P/Consult       NAME: Holger Hale - ROOM: [address] [place] [address]-A - MRN: [id number] - Age: 78year old - :  1940    Date of Admission: 2018  6:56 PM  Admission Diagnosis: Atrial fibrillation with rapid ventricular respo CATARACT EXTRACAP,INSERT LENS      Comment: Procedure: RIGHT PHACOEMULSIFICATION OF                CATARACT WITH INTRAOCULAR LENS IMPLANT 82501;                 Surgeon:  Magda Valenzuela MD;  Location: [address] [place] [address]  7/18/2012: REM 200 mg Oral Daily   • Enoxaparin Sodium  1 mg/kg Subcutaneous 2 times per day   • Metoprolol Succinate ER  100 mg Oral Daily Beta Blocker   • Senna-Docusate Sodium  1 tablet Oral Daily   • piperacillin-tazobactam  3.375 g Intravenous Q8H   • vancomycin  15 Soft, non-tender, bowel sounds active all four quadrants,     no masses, no organomegaly   Extremities:   Extremities normal, atraumatic, no cyanosis or edema   Pulses:   2+ and symmetric all extremities   Skin:   Skin color, texture, turgor normal, no predominant L sided findings. Now with bilat opacities, should be entertained again. - hold amiodarone for now given the possibility of amio toxicity to explain progressive findings. 4. PE / DVT: dx last admission  - cont lmwh  5.  FEN: NPO  6. prophy:

## 2018-04-11 NOTE — PHYSICAL THERAPY NOTE
PT consult received. Pt has been transferred to ICU. Pt will need new PT orders when medically appropriate.

## 2018-04-11 NOTE — PROGRESS NOTES
[address] [facility] dosing service    Follow-up Pharmacokinetic Consult for Vancomycin Dosing     Smooth Hector is a 78year old male who is being treated for pneumonia. Patient is on day 2 of Vancomycin 750 mg IV Q 12 hours.   Goal trough is 15-20 ug/mL function. 4.  Pharmacy will follow and monitor renal function changes, toxicity and efficacy.     Melisa Snell, PharmD  4/11/2018  10:17 AM  [address] [place] [address] Extension: [phone]

## 2018-04-11 NOTE — SIGNIFICANT EVENT
Rapid response called due to increased O2 requirements over several hours. Patient was admitted with pneumonia, afib with RVR. Overnight patient was switched from HF NC to NRB and still saturating 80-90%.   CXR showed no change, CTA per radiology report s

## 2018-04-11 NOTE — PLAN OF CARE
CARDIOVASCULAR - ADULT    • Maintains optimal cardiac output and hemodynamic stability Progressing        GASTROINTESTINAL - ADULT    • Minimal or absence of nausea and vomiting Progressing        RESPIRATORY - ADULT    • Achieves optimal ventilation and o

## 2018-04-11 NOTE — SLP NOTE
Pt with progressive respiratory distress; transferred to ICU, made NPO and now on BiPap. Will continue to follow as appropriate and available.      Tanner Rivera MA, [address] [facility], [place]  Pager

## 2018-04-11 NOTE — PROGRESS NOTES
[facility]  Progress Note    [address] Patient Status:  Inpatient    1940 MRN [id number]   [facility] 7NE-A Attending Dami Ruiz MD   [facility] Day # 2 PCP Jeff Smith MD     Chief complaint: dyspnea and low O2 acquired pneumonia of left lung, unspecified part of lung     Pulmonary embolus ([address]     Other acute pulmonary embolism without acute cor pulmonale ([address]     Community acquired pneumonia, unspecified laterality     Acute deep vein thrombosis (DVT) of othe

## 2018-04-11 NOTE — PROGRESS NOTES
[facility] hospitalist daily note  Patient was seen/examined on 4/11/18    S; patient transferred to ICU due to respiratory failure. BIPAP in place during my visit.  Per patient his breathing is better    medicaitons in EPIC    PE    04/11/18  1210   BP:    Pulse:

## 2018-04-12 NOTE — PROGRESS NOTES
[facility]  Progress Note    [address] Patient Status:  Inpatient    1940 MRN [id number]   [facility] 4SW-A Attending Saint Hausen, MD   [facility] Day # 3 PCP Shayna Church MD     Chief complaint: Dyspnea and desat on minim Hyponatremia     Leukocytosis     Azotemia     Hyperglycemia     Community acquired pneumonia of left lung     Community acquired pneumonia of left lung, unspecified part of lung     Pulmonary embolus ([address]     Other acute pulmonary embolism without acute

## 2018-04-12 NOTE — PLAN OF CARE
Pt alert and orientated x4. Received on bipap fio2 50% 02 sats 94% breathing appears regular, non labored. Throughout night pt unable to tolerate turning and repositions RR- 30s and requiring more 02, increased WOB.  Toradol given to attempt to relief discom

## 2018-04-12 NOTE — PROGRESS NOTES
Critical Care Progress Note     Assessment / Plan:  1. Acute resp failure: CT shows significantly progressed infiltrates in R lung.   DDx includes pneumonia (recurrent aspiration vs hcap), pulm edema (echo was unremarkable but pattern would be consistent) v

## 2018-04-12 NOTE — SLP NOTE
Pt maintained on BiPap with NPO status. Will continue to follow and re-assess swallow fxn as appropriate and available.     Tanner Rivera MA, [address] [facility]  Pager

## 2018-04-12 NOTE — PLAN OF CARE
RESPIRATORY - ADULT    • Achieves optimal ventilation and oxygenation Not Progressing        Received patient this am awake and alert, oriented x4. Appears overall weak and fatigued. Bipap 12/6 60%, lungs sound crackles throughout. Dyspnea on exertion.  Amador

## 2018-04-12 NOTE — PROGRESS NOTES
[facility] Cardiology  Progress Note    Smooth Woodson Patient Status:  Inpatient    1940 MRN [id number]   [facility] 4SW-A Attending Robert Ely MD   Hosp Day # 3 PCP Felicitas Shore MD     Outpatient cardiologist:  NA use  Abdomen: Soft, non-tender; bowel sounds are normoactive  Extremities: No clubbing/cyanosis; moves all 4 extremities normally      Current Facility-Administered Medications:  potassium chloride 40 mEq in sodium chloride 0.9 % 250 mL IVPB 40 mEq Usman Bush Data:    Lab Results  Component Value Date   WBC 2.7 04/12/2018   HGB 11.1 04/12/2018   HCT 34.3 04/12/2018   .0 04/12/2018       Lab Results  Component Value Date   INR 0.97 03/19/2018   INR 1.13 (H) 12/04/2017   INR 1.1 09/22/2017       Lab Result

## 2018-04-13 NOTE — PROGRESS NOTES
[facility] hospitalist daily note  Patient was seen/examined on 4/12/18     S;in icu, on bipap     medicaitons in EPIC     PE 99.3  102 134/85  Gen: awake, alert, BIPAP  HEENT;  anicteric  CV: irreg, nl S1/S2  Pulm: course b/l  Abd; soft, not tender, +BS  Ext: no

## 2018-04-13 NOTE — PLAN OF CARE
Impaired Swallowing    • Minimize aspiration risk Not Progressing          CARDIOVASCULAR - ADULT    • Maintains optimal cardiac output and hemodynamic stability Progressing        RESPIRATORY - ADULT    • Achieves optimal ventilation and oxygenation Progr

## 2018-04-13 NOTE — PROGRESS NOTES
[facility] dosing service    Follow-up Pharmacokinetic Consult for Vancomycin Dosing     Smooth Sarmiento is a 78year old male who is being treated for pneumonia. Patient is on day 3 of Vancomycin currently on 1 gm IV Q 12 hours.   Goal trough is Pharmacy will need Scr daily while on Vancomycin to assess renal function. 4.  Pharmacy will follow and monitor renal function changes, toxicity and efficacy.     Blake Sánchez PharmD  4/13/2018  12:15 PM  [address] [place] [address] Extension: [phone]

## 2018-04-13 NOTE — PROGRESS NOTES
[address] [facility] Cardiology  Progress Note    LaurieSai Morley Patient Status:  Inpatient    1940 MRN [id number]   [facility] 4SW-A Attending Javier Carlos MD   [facility] Day # 4 PCP Merilynn Apley, MD     Outpatient cardiologist:  NA normoactive  Extremities: No clubbing/cyanosis; moves all 4 extremities normally      Current Facility-Administered Medications:  MethylPREDNISolone Sodium Succ (Solu-MEDROL) injection 80 mg 80 mg Intravenous Q8H   Ketorolac Tromethamine (TORADOL) injectio .0 04/13/2018       Lab Results  Component Value Date   INR 0.97 03/19/2018   INR 1.13 (H) 12/04/2017   INR 1.1 09/22/2017       Lab Results  Component Value Date    04/13/2018   K 3.8 04/13/2018    04/13/2018   CO2 28.0 04/13/2018   B

## 2018-04-13 NOTE — PROGRESS NOTES
[facility]  Progress Note    [address] Patient Status:  Inpatient    1940 MRN [id number]   [facility] 4SW-A Attending Lillian Alexander MD   [facility] Day # 4 PCP Tylor Layne MD     Chief complaint: Dyspnea. Awake and alert. Hyperglycemia     Community acquired pneumonia of left lung     Community acquired pneumonia of left lung, unspecified part of lung     Pulmonary embolus ([address]     Other acute pulmonary embolism without acute cor pulmonale ([address]     Community acquired pneu

## 2018-04-13 NOTE — PROGRESS NOTES
[address] [facility] Patient Status:  Inpatient    1940 MRN [id number]   [facility] 4SW-A Attending Yovani Hardin MD   [facility] Day # 4 PCP Rachel Aguiar MD     Pulm / Critical Care Progress Note     S: switched from bi abnormality, atraumatic. Lungs: slightly coarse bs   Chest wall: No tenderness or deformity. Heart: Regular rate and rhythm, normal S1S2, no murmur. Abdomen: soft, non-tender, non-distended, positive BS.    Extremity: no edema         Recent Labs   La opacities, should be entertained again  - amio on hold   4. PE / DVT: dx last admission  - cont lmwh  5. FEN: bedside swallow. Po if she does well. 6. Prophy: therapeutic lmwh  7. Dispo: full code. Critically ill and at risk for decompensation.  D/w pt's

## 2018-04-13 NOTE — SLP NOTE
Order received for repeat bedside swallow evaluation. Pt recently removed from BiPap and now on vapotherm 100% fio2. Discussed patient with Rn who reported pt desats easily and requested bedside swallow evaluation be deferred at this time.  Will continue to

## 2018-04-13 NOTE — DIETARY NOTE
[address] follow-up ASSESSMENT    Pt meets severe malnutrition criteria.     CRITERIA FOR MALNUTRITION DIAGNOSIS:  Criteria for severe malnutrition diagnosis: chronic illness related to wt loss greater than 7.5% in 3 months, energy intake restrictive to promote po intake. If pt unable to take adequate po and aggressive nutrition care desired, recommend consider g-tube for nutrition. Patient presents with fever and low oxygen saturations.   The patient is on chemotherapy for metastatic co 75% intake of oral supplements  3. No signs of skin breakdown  4.  Maintain lean body mass    MEDICATIONS:  Noted    LABS:  Noted    Pt is at high nutrition risk    FOLLOW-UP DATE:  4/16/18    Duncan Oates RD, LDN  Pager [phone]

## 2018-04-14 NOTE — DIETARY NOTE
Nutrition Short Note:   RD consulted for Tubefeeding recommendations. Pt had swallow eval and recommended NPO. Tubefeeding recommendations:  Jevity 1.2 starting at 10ml/hr and advancing by 10 q 8hrs to goal of 70ml/hr.   Recommend 100ml of water flushes q

## 2018-04-14 NOTE — PROGRESS NOTES
[facility]  Cardiology Progress Note    CjbenjaminToyaSai Medel Gayle Patient Status:  Inpatient    1940 MRN [id number]   [facility] 4SW-A Attending Barbi Haile MD   [facility] Day # 5 PCP García Stearns MD     Assessment:  Acute resp failure- 3.7 oz (50 kg)  03/09/18 1243 : 110 lb (49.9 kg)  12/13/17 1311 : 127 lb (57.6 kg)      Physical Exam:  General:  no apparent distress. Somewhat emaciated.   HEENT:  mucous membranes sl dry, sclera anicteric  Neck: Supple with no JVD  Cardiac:   RRR, perico MD    4/14/2018  10:00 AM

## 2018-04-14 NOTE — PLAN OF CARE
Problem: Impaired Swallowing  Goal: Minimize aspiration risk  Interventions:  - NPO  Outcome: Not Progressing

## 2018-04-14 NOTE — PROGRESS NOTES
[address] [facility] Patient Status:  Inpatient    1940 MRN [id number]   [facility] 4SW-A Attending Yovani Hardin MD   [address] Day # 5 PCP Rachel Aguiar MD     Pulm / Critical Care Progress Note     S: pt states he fee wall: No tenderness or deformity. Heart: Regular rate and rhythm, normal S1S2, no murmur. Abdomen: soft, non-tender, non-distended, positive BS.    Extremity: no edema         Recent Labs   Lab  04/12/18   0458  04/13/18   0430  04/14/18   0442   WBC  2 entertained again  - amio on hold   - currently sr  4. PE / DVT: dx last admission  - cont lmwh  5. FEN: bedside swallow. Po if he does well. If not would place dobhoff for tube feeds. 6. Prophy: therapeutic lmwh  7. Dispo: full code.  Critically ill and

## 2018-04-14 NOTE — PLAN OF CARE
Impaired Swallowing    • Minimize aspiration risk Not Progressing          RESPIRATORY - ADULT    • Achieves optimal ventilation and oxygenation Not Progressing          Maintains adequate nutritional intake (undernourished) Progressing        Pt received

## 2018-04-14 NOTE — PLAN OF CARE
GASTROINTESTINAL - ADULT    • Maintains or returns to baseline bowel function Not Progressing        Pt remains npo unable to have swallow eval due to high O2 needs , speech to attempt again today.   CARDIOVASCULAR - ADULT    • Maintains optimal cardiac out

## 2018-04-14 NOTE — SLP NOTE
ADULT SWALLOWING EVALUATION    ASSESSMENT    ASSESSMENT/OVERALL IMPRESSION:  Bedside swallow evaluation completed per MD request. Per RN, pt more stable today, though oxygen needs remain high (100% fiO2 on vapotherm).  Pt is familiar to this department from with trace to mild valleculae residue clearing with spontaneous second swallow and cued liquid wash.    Patient was in an oblique view for thin liquids with appearance of laryngeal penetration however when he was turned to more lateral view there was no janie support  Diet Prior to Admission: Regular; Thin liquids  Precautions: Aspiration    Patient/Family Goals: To eat and drink     SWALLOWING HISTORY  Current Diet Consistency: NPO  Dysphagia History: See above.    Imaging Results: \"CXR: CONCLUSION:  Extensive Emil Villegas

## 2018-04-14 NOTE — PROGRESS NOTES
[facility] hospitalist daily note  Patient was seen/examined on 4/13/18     S;in icu, on vapotherm   medicaitons in EPIC     PE on vapotherm 40 LPM 90% FiO2  Gen: awake, alert, BIPAP  HEENT;  anicteric  CV: irreg, nl S1/S2  Pulm: course b/l  Abd; soft, not tender

## 2018-04-14 NOTE — RESPIRATORY THERAPY NOTE
Pt not tolerating BiPAP use for sleep. Pt panicked when BiPAP mask was placed on his face. Mask removed. Vapotherm in place, 40 L/90%. Pt much more comfortable. SpO2 95%. Pt now resting comfortably. RN aware. Will continue to monitor closely.

## 2018-04-15 NOTE — PROGRESS NOTES
[facility] hospitalist daily note  Patient was seen/examined on 4/14/18     S;in icu, on vapotherm, DH placed       medicaitons in EPIC     PE on vapotherm 40 LPM 1000% FiO2  Gen: awake, alert,   HEENT;  anicteric  CV: irreg, nl S1/S2  Pulm: course b/l  Abd; soft

## 2018-04-15 NOTE — PROGRESS NOTES
[address] [facility] Patient Status:  Inpatient    1940 MRN [id number]   [facility] 4SW-A Attending Jonathan Ovalle MD   [facility] Day # 6 PCP Jenniffer Santoro MD     Pulm / Critical Care Progress Note     S: pulled out his d Normocephalic, without obvious abnormality, atraumatic. Lungs: coarse bs bilat   Chest wall: No tenderness or deformity. Heart: Regular rate and rhythm, normal S1S2, no murmur. Abdomen: soft, non-tender, non-distended, positive BS.    Extremity: no ed that time felt less likely given predominant L sided findings. Now with bilat opacities, should be entertained again  - amio on hold   - currently sr  4. PE / DVT: dx last admission  - cont lmwh  5. FEN: dobhoff, tf, free water  6.  Prophy: therapeutic lmwh

## 2018-04-15 NOTE — PLAN OF CARE
Assumed care of pt at 1910 while helping off going Rn change pt we noted that pt was not responding to verbal stimuli but only minimally to sternal rub. RT notified for ABG and MINGON Beverley Books notified and came to room.  Pt was placed on Bipap and mentatio

## 2018-04-15 NOTE — PROGRESS NOTES
[facility]  Cardiology Progress Note    Smooth Weiss Patient Status:  Inpatient    1940 MRN [id number]   [facility] 4SW-A Attending Erlinda Chaparro MD   [facility] Day # 6 PCP Heidi Youngblood MD        Assessment:  Acute resp failu 0515 : 121 lb 4.8 oz (55 kg)  03/19/18 2145 : 121 lb 4.1 oz (55 kg)  03/19/18 1553 : 125 lb (56.7 kg)  03/10/18 0434 : 110 lb 3.7 oz (50 kg)  03/09/18 1243 : 110 lb (49.9 kg)  12/13/17 1311 : 127 lb (57.6 kg)      Physical Exam:  General:  no apparent dist input(s): BNP in the last 72 hours.     Invalid input(s): TROPI    Lab Results  Component Value Date   INR 0.97 03/19/2018   INR 1.13 (H) 12/04/2017   INR 1.1 09/22/2017         Telemetry:  JOÃO Noe MD    4/15/2018  9:04 AM

## 2018-04-15 NOTE — PROGRESS NOTES
[facility]  Progress Note    [address] Patient Status:  Inpatient    1940 MRN [id number]   [facility] 4SW-A Attending Rachel Aguiar MD   [address] Day # 6 PCP Adiel Vazquez MD     Subjective:  Smooth Willard is a(n)

## 2018-04-15 NOTE — PROGRESS NOTES
[facility] hospitalist daily note  Patient was seen/examined on 4/15/18     S;in icu, on vapotherm, precedex        medicaitons in EPIC     PE on vapotherm 40 LPM 9-0% FiO2  Gen: resting, vapotherm in place  HEENT;  anicteric  CV: irreg, nl S1/S2  Pulm: course b/

## 2018-04-15 NOTE — PLAN OF CARE
Impaired Swallowing    • Minimize aspiration risk Not Progressing          Patient/Family Goals    • Patient/Family Long Term Goal Not Progressing    • Patient/Family Short Term Goal Not Progressing          RESPIRATORY - ADULT    • Achieves optimal ventil

## 2018-04-15 NOTE — SLP NOTE
Pt seen for follow-up. Pt's RN present and pt's daughter. Pt given minimal sedation last night due to attempting to pull out tubes etc. Pt continues to be on vapotherm and in a weakened condition. RN replaced the Dobhoff tube.  Pt not appropriate for po tri

## 2018-04-15 NOTE — PLAN OF CARE
CARDIOVASCULAR - ADULT    • Absence of cardiac arrhythmias or at baseline Not Progressing        GASTROINTESTINAL - ADULT    • Maintains adequate nutritional intake (undernourished) Not Progressing        Impaired Functional Mobility    • Achieve highest/s

## 2018-04-16 NOTE — PROGRESS NOTES
[facility]  Progress Note    [address] Patient Status:  Inpatient    1940 MRN [id number]   [facility] 4SW-A Attending Rachel Aguiar MD   [address] Day # 7 PCP Adiel Vazquez MD     Chief complaint: Sleeping and difficult to Leukocytosis     Azotemia     Hyperglycemia     Community acquired pneumonia of left lung     Community acquired pneumonia of left lung, unspecified part of lung     Pulmonary embolus ([address]     Other acute pulmonary embolism without acute cor pulmonale (HC

## 2018-04-16 NOTE — PROGRESS NOTES
[address] Patient Status:  Inpatient    1940 MRN [id number]   [facility] 4SW-A Attending Winsome Ellis MD   [facility] Day # 7 PCP Moshe Rosas MD     Critical Care Progress Note      Assessment/Plan:  1.  Acute resp failure: CT times per day   • MethylPREDNISolone Sodium Succ  80 mg Intravenous Q8H   • Insulin Aspart Pen  1-5 Units Subcutaneous 4 times per day   • albuterol sulfate  2.5 mg Nebulization 4 times per day   • Enoxaparin Sodium  60 mg Subcutaneous 2 times per day   • LKYPCX3B  39   IDGEG9B  61*   ABGHCO3  28.5*   ABGBE  4.4   TEMP  97.5   GINA  Positive   SITE  Left Radial   DEV  Bi-PAP   THGB  12.2*     No results for input(s): BNP in the last 72 hours. No results for input(s): TROP, CK in the last 72 hours.     Im

## 2018-04-16 NOTE — SLP NOTE
Attempted to see pt for swallow evaluation. Per RN, pt's oxygen needs remain high, and pt's family considering palliative care. Pt on tube feeds via dobhoff tube. Pt not a candidate for swallow evaluation today. SLP to continue to f/u.    Brayan Montalvo

## 2018-04-16 NOTE — PROGRESS NOTES
[facility] Cardiology  Progress Note    CjluisSai Morley Patient Status:  Inpatient    1940 MRN [id number]   [facility] 4SW-A Attending Javier Carlos MD   [facility] Day # 7 PCP Merilynn Apley, MD     Outpatient cardiologist:  NA clubbing/cyanosis; moves all 4 extremities normally      Current Facility-Administered Medications:  potassium chloride (K-SOL) 40 meq/30 ml (10%) oral solution 40 mEq 40 mEq Per NG Tube Q4H   acetaminophen (TYLENOL EXTRA STRENGTH) tab 1,000 mg 1,000 mg Pe 04/16/2018   HCT 37.7 04/16/2018   .0 04/16/2018       Lab Results  Component Value Date   INR 0.97 03/19/2018   INR 1.13 (H) 12/04/2017   INR 1.1 09/22/2017       Lab Results  Component Value Date    04/16/2018   K 3.1 04/16/2018    04/

## 2018-04-16 NOTE — CM/SW NOTE
MSW met with the patient's daughters and RN Pura Willis in conference room to discuss goals of care. Family has decided to meet with [facility]. MSW sent referral via [address]. Ramone Evans will contact daughter Shayne Magallon to discuss start of care.   The jimi

## 2018-04-16 NOTE — PLAN OF CARE
Impaired Swallowing    • Minimize aspiration risk Not Progressing          CARDIOVASCULAR - ADULT    • Maintains optimal cardiac output and hemodynamic stability Progressing    • Absence of cardiac arrhythmias or at baseline Progressing          RESPIRATOR

## 2018-04-16 NOTE — DIETARY NOTE
[address] follow-up ASSESSMENT    Pt meets severe malnutrition criteria.     CRITERIA FOR MALNUTRITION DIAGNOSIS:  Criteria for severe malnutrition diagnosis: chronic illness related to wt loss greater than 7.5% in 3 months, energy intake unable to take adequate po and aggressive nutrition care desired, recommend consider g-tube for nutrition. Patient presents with fever and low oxygen saturations. The patient is on chemotherapy for metastatic colon cancer.   He had chemo last week Mond signs of skin breakdown  4.  Maintain lean body mass    MEDICATIONS:  Noted    LABS:  Noted    Pt is at high nutrition risk    FOLLOW-UP DATE:  4/18/18    Gabriel Garcia RD, [address]   Pager [phone]

## 2018-04-16 NOTE — PROGRESS NOTES
[facility] Pharmacy Note: Route Optimization for Acetaminophen (OFIRMEV)    Patient is currently on Acetaminophen (OFIRMEV) 1000 mg IV every 6 hours prn.    The patient meets the criteria to convert to the oral equivalent as established by the IV to Oral conversio

## 2018-04-16 NOTE — PROGRESS NOTES
[facility]    PCP: Aaron Shearer MD    Chief Complaint:  f/u    SUBJECTIVE:  Afebrile. Family met with pulm and are considering palliative care. Pt would like to go home.     OBJECTIVE:  Temp:  [97.4 °F (36.3 °C)-98.7 °F (37.1 °C)] 97.4 °F (36.3 2.4*   --   3.1   --    GLU  202*   --   195*  275*       Recent Labs   Lab  04/14/18   0442   ALT  12*   AST  15   ALB  2.3*       Recent Labs   Lab  04/14/18   2325  04/15/18   0537  04/15/18   1210  04/15/18   1805  04/15/18   2330   PGLU  174*  199*  2

## 2018-04-16 NOTE — CM/SW NOTE
MSW met with the patient, daughter Elvira and LASHAUN Mandel at bedside to discuss dc planning. Per daughter, she would like the patient's other daughter Rony Vicente who is an MD at [facility] to be a part of the discussion.   The patient continues to say in Mandarin \"he want

## 2018-04-17 NOTE — SLP NOTE
Pt transitioning to hospice care. No further speech therapy services indicated. Will sign off. Thank you.     Reji Keller M.S. [address] [facility]  Pager [phone]

## 2018-04-17 NOTE — CM/SW NOTE
Spoke with [address] (cell [phone]) who is working to arrange all home O2 and equipment as well as locating hospice nurse for continuous care to receive pt into the home.  Gifty Roberts has HFNC and mask ordered for home to be able to provide

## 2018-04-17 NOTE — PROGRESS NOTES
[facility] Cardiology  Progress Note    LashellToyaSai Sage Patient Status:  Inpatient    1940 MRN [id number]   [facility] 4SW-A Attending Shalonda Rosales MD   [address] Day # 8 PCP Yayo Regan MD     Outpatient cardiologist:  NA normoactive  Extremities: No clubbing/cyanosis; moves all 4 extremities normally      Current Facility-Administered Medications:  morphINE sulfate (PF) 4 MG/ML injection 1 mg 1 mg Intravenous Q2H PRN   Or      morphINE sulfate (PF) 4 MG/ML injection 2 mg 2 melatonin tab TABS 1 mg 1 mg Oral Nightly PRN       Laboratory Data:    Lab Results  Component Value Date   WBC 25.3 04/17/2018   HGB 11.2 04/17/2018   HCT 37.5 04/17/2018   .0 04/17/2018       Lab Results  Component Value Date   INR 0.97 03/19/20

## 2018-04-17 NOTE — PROGRESS NOTES
04/17/18 1422   Clinical Encounter Type   Referral To ( scanned signed POLST form into patient's electronic record.    to remain available at pager [phone].)

## 2018-04-17 NOTE — PROGRESS NOTES
NURSING DISCHARGE NOTE    Discharged Home via Ambulance. Accompanied by Support staff  Belongings Taken by patient/family. Pt d/c home today via ambulance on bipap, pt to begin hospice care when arrives at home.   Ativan given IV prior to placing

## 2018-04-17 NOTE — CM/SW NOTE
Call out to [facility] at [phone], to see when equipment and O2 will be in the home today to coordinate discharge. Per RN, will need bipap to support respiratory status to get home.     Plan: Await call back from [facility] to confirm all

## 2018-04-17 NOTE — CM/SW NOTE
Received call from RN that pt's daughter  Mitch Kanner has received word from Todd Mello that equipment has arrived to the home, pending home meds.  Per hospice update to family, hospice RN German Arce will be receiving pt into the home

## 2018-04-17 NOTE — DISCHARGE SUMMARY
[facility] Internal Medicine Discharge Summary    Patient ID:  Lori De La Cruz  [id number]  36 year old  9/12/1940    Admit date: 4/9/2018  Discharge date and time: 4/17/18  Attending Physician: Prudencio Beyer MD  Primary Care Physician: Agustin Keller MD steroids, diuretics and was on high flow O2 or bipap. His amio was held and he was given dilt.  He required feeding tube due to failing swallow eval.     Ultimately, his condition was not improving and he wanted to stop aggressive therapies and go home on c feeding tube in the fundus of the stomach. There a right IJ CT compatible chest port catheter with the tip in the high right atrium. There are extensive bilateral infiltrates.   The infiltrates in the right lower lobe have marginally improved since earlie PATIENT STATED HISTORY: (As transcribed by Technologist)  Patient offered no additional history at this time. FINDINGS:  LUNGS:  There is patchy airspace consolidation in the left mid and upper lung with pleural thickening along the left apex.   Nodular prior examination. There is extensive airspace disease within the left upper lobe and left lower lobe with pleural thickening around the left pulmonary apex which may represent fluid. This finding was not present on 3/9/2018.   There is a stable right low contrast-enhanced multislice CT angiography is performed through the pulmonary arterial anatomy. 3D volume renderings are generated. Dose reduction techniques were used.  Dose information is transmitted to the [facility] Radiology) Kirby Do 3/19/2018  PROCEDURE:  CT ANGIOGRAPHY, CHEST (CPT=71275)  COMPARISON:  [facility] , CT BRAIN OR HEAD (10254), 3/19/2018, 18:03. [facility] , CT CHEST+ABDOMEN+PELVIS(ALL CNTRST ONLY)(CPT=71260/04924), 3/02/2018, 19:12.   INDICATIONS:  DVT  TECHNIQUE:  IV contrast-e CONCLUSION:  1. Small filling defect identified within the left lower lobe suggestive of an acute pulmonary embolus. 2.  Worsening bilateral upper lobe pulmonary infiltrates representing pneumonia.  Followup chest radiograph after appropriate treatment popliteal vein, paired proximal to distal left posterior tibial veins, and paired proximal left peroneal veins. COMPRESSION:  Normal compressibility, phasicity, and augmentation with exception of the above. OTHER:  Negative.       CONCLUSION:  Acute deep gastric fundus. Cardiac silhouette is mildly prominent. Decreased apical redistribution of the pulmonary vasculature is noted. Bilateral patchy consolidations are again identified and mildly decreased. No pneumothorax is noted.       CONCLUSION:  Bilate in the gastric fundus. Extensive bilateral infiltrates again noted.   Slight improvement in aeration in the right lower lung field compared to the previous exam.     Dictated by: Cece Gabriel MD on 4/14/2018 at 15:02     Approved by: Cece Gabriel interstitial abnormalities throughout the right lung. Nodule overlying the right lower lung fields no longer identified. No pneumothorax. CONCLUSION:  Increased interstitial edema and consolidation degree be due to pulmonary edema is noted.      Dict tolerated  Diet: pleasure feed  Wound Care: none needed  Code Status: DNR  O2: as much as needed for comfort  Total Time Coordinating Care: Greater than 30 minutes Patient had opportunity to ask questions and state understand and agree with therapeutic gabbi

## 2018-04-17 NOTE — CM/SW NOTE
Hospice RN Juan Daniel Meade confirms everything [facility] now, including meds. Juan Daniel Meade will be at the home to receive pt into hospice care.  [facility] first available bipap rig at [address] [facility] has continuous care RN that

## 2018-04-17 NOTE — PLAN OF CARE
RESPIRATORY - ADULT    • Achieves optimal ventilation and oxygenation Not Progressing        Safety Risk - Non-Violent Restraints    • Patient will remain free from self-harm Not Progressing          CARDIOVASCULAR - ADULT    • Maintains optimal cardiac ou

## 2018-04-17 NOTE — PROGRESS NOTES
Critical Care Progress Note     Assessment / Plan:  1. Acute resp failure: CT shows significantly progressed infiltrates in R lung and overall more diffuse bilateral GGO.  This seems very consistent with amio toxicity or some other drug / inflammatory pneum

## 2018-04-17 NOTE — PROGRESS NOTES
[facility]  Progress Note    [address] Patient Status:  Inpatient    1940 MRN [id number]   [facility] 4SW-A Attending Dorie Peñaloza MD   [address] Day # 8 PCP Gerri Miramontes MD     Chief complaint: wants to go home. Now.     O acquired pneumonia of left lung     Community acquired pneumonia of left lung, unspecified part of lung     Pulmonary embolus ([address]     Other acute pulmonary embolism without acute cor pulmonale ([address]     Community acquired pneumonia, unspecified lateralit

## 2018-04-17 NOTE — PROGRESS NOTES
Pt received this AM, precedex gtt, drowsy, follows commands, wants to go home. O2 sats maintained on vapotherm. BP stable, NSR on monitor, afebrile. NPO, tube feeds/flush per dobhoff. Pt incontinent to brief.   Turning pt in bed, mepilex placed to sacru

## 2018-12-31 ENCOUNTER — PRIOR ORIGINAL RECORDS (OUTPATIENT)
Dept: OTHER | Age: 78
End: 2018-12-31

## 2019-11-20 NOTE — PROGRESS NOTES
[facility]  Progress Note    [address] Patient Status:  Inpatient    1940 MRN [id number]   [facility] 4SW-A Attending Yovani Hardin MD   [address] Day # 5 PCP Rachel Aguiar MD     Subjective:  Smooth Lyons is a(n) Urine Pregnancy Test Result: negative

## 2020-10-09 LAB
ALBUMIN/GLOB SERPL: 1 (CALC) (ref 1–2.5)
ALBUMIN/GLOB SERPL: 1.1 (CALC) (ref 1–2.5)
ALBUMIN/GLOB SERPL: 1.2 (CALC) (ref 1–2.5)
ALBUMIN/GLOB SERPL: 1.3 (CALC) (ref 1–2.5)
ALBUMIN: 2.7 G/DL (ref 3.6–5.1)
ALBUMIN: 2.8 G/DL (ref 3.6–5.1)
ALBUMIN: 3 G/DL (ref 3.6–5.1)
ALBUMIN: 3.1 G/DL (ref 3.6–5.1)
ALBUMIN: 3.1 G/DL (ref 3.6–5.1)
ALBUMIN: 3.2 G/DL (ref 3.6–5.1)
ALBUMIN: 3.2 G/DL (ref 3.6–5.1)
ALBUMIN: 3.3 G/DL (ref 3.6–5.1)
ALBUMIN: 3.4 G/DL (ref 3.6–5.1)
ALBUMIN: 3.5 G/DL (ref 3.6–5.1)
ALBUMIN: 4 G/DL (ref 3.6–5.1)
ALKALINE PHOSPHATASE: 100 UNIT/L (ref 40–115)
ALKALINE PHOSPHATASE: 105 UNIT/L (ref 40–115)
ALKALINE PHOSPHATASE: 107 UNIT/L (ref 40–115)
ALKALINE PHOSPHATASE: 120 UNIT/L (ref 40–115)
ALKALINE PHOSPHATASE: 120 UNIT/L (ref 40–115)
ALKALINE PHOSPHATASE: 126 UNIT/L (ref 40–115)
ALKALINE PHOSPHATASE: 140 UNIT/L (ref 40–115)
ALKALINE PHOSPHATASE: 89 UNIT/L (ref 40–115)
ALKALINE PHOSPHATASE: 92 UNIT/L (ref 40–115)
ALKALINE PHOSPHATASE: 93 UNIT/L (ref 40–115)
ALKALINE PHOSPHATASE: 94 UNIT/L (ref 40–115)
ALKALINE PHOSPHATASE: 94 UNIT/L (ref 40–115)
ALKALINE PHOSPHATASE: 95 UNIT/L (ref 40–115)
ALKALINE PHOSPHATASE: 99 UNIT/L (ref 40–115)
ALT: 10 UNIT/L (ref 9–46)
ALT: 10 UNIT/L (ref 9–46)
ALT: 11 UNIT/L (ref 9–46)
ALT: 12 UNIT/L (ref 9–46)
ALT: 13 UNIT/L (ref 9–46)
ALT: 15 UNIT/L (ref 9–46)
ALT: 19 UNIT/L (ref 9–46)
ALT: 23 UNIT/L (ref 9–46)
ALT: 28 UNIT/L (ref 9–46)
ALT: 34 UNIT/L (ref 9–46)
ALT: 48 UNIT/L (ref 9–46)
ALT: 9 UNIT/L (ref 9–46)
APPEARANCE: ABNORMAL
AST: 14 UNIT/L (ref 10–35)
AST: 15 UNIT/L (ref 10–35)
AST: 15 UNIT/L (ref 10–35)
AST: 18 UNIT/L (ref 10–35)
AST: 18 UNIT/L (ref 10–35)
AST: 19 UNIT/L (ref 10–35)
AST: 22 UNIT/L (ref 10–35)
AST: 26 UNIT/L (ref 10–35)
AST: 31 UNIT/L (ref 10–35)
AST: 42 UNIT/L (ref 10–35)
AST: 44 UNIT/L (ref 10–35)
AST: 68 UNIT/L (ref 10–35)
BACTERIA: ABNORMAL /HPF
BASO%: 0.3 %
BASO%: 0.4 %
BASO%: 0.6 %
BASO%: 0.7 %
BASO%: 0.8 %
BASO%: 0.8 %
BASO%: 0.9 %
BASO%: 1 %
BASO%: 1 %
BASO%: 1.2 %
BASO%: 1.4 %
BASO%: 1.4 %
BASO%: 1.6 %
BASO%: 2 %
BASO: 0 10^3/UL
BASO: 0.1 10^3/UL
BASO: 0.2 10^3/UL
BILIRUBIN, TOTAL: 0.4 MG/DL (ref 0.2–1.2)
BILIRUBIN, TOTAL: 0.4 MG/DL (ref 0.2–1.2)
BILIRUBIN, TOTAL: 0.5 MG/DL (ref 0.2–1.2)
BILIRUBIN, TOTAL: 0.6 MG/DL (ref 0.2–1.2)
BILIRUBIN, TOTAL: 0.6 MG/DL (ref 0.2–1.2)
BILIRUBIN, TOTAL: 0.7 MG/DL (ref 0.2–1.2)
BILIRUBIN, TOTAL: 0.8 MG/DL (ref 0.2–1.2)
BILIRUBIN, TOTAL: 0.8 MG/DL (ref 0.2–1.2)
BILIRUBIN, TOTAL: 0.9 MG/DL (ref 0.2–1.2)
BILIRUBIN, TOTAL: 0.9 MG/DL (ref 0.2–1.2)
BILIRUBIN, TOTAL: 1 MG/DL (ref 0.2–1.2)
BILIRUBIN, TOTAL: 1.1 MG/DL (ref 0.2–1.2)
BILIRUBIN: NEGATIVE
BUN/CREATININE RATIO: ABNORMAL (CALC) (ref 6–22)
CALCIUM: 8.2 MG/DL (ref 8.6–10.3)
CALCIUM: 8.3 MG/DL (ref 8.6–10.3)
CALCIUM: 8.4 MG/DL (ref 8.6–10.3)
CALCIUM: 8.5 MG/DL (ref 8.6–10.3)
CALCIUM: 8.5 MG/DL (ref 8.6–10.3)
CALCIUM: 8.6 MG/DL (ref 8.6–10.3)
CALCIUM: 8.7 MG/DL (ref 8.6–10.3)
CALCIUM: 8.9 MG/DL (ref 8.6–10.3)
CALCIUM: 8.9 MG/DL (ref 8.6–10.3)
CALCIUM: 9.3 MG/DL (ref 8.6–10.3)
CARBON DIOXIDE: 21 MMOL/L (ref 20–31)
CARBON DIOXIDE: 21 MMOL/L (ref 20–31)
CARBON DIOXIDE: 22 MMOL/L (ref 20–31)
CARBON DIOXIDE: 23 MMOL/L (ref 20–31)
CARBON DIOXIDE: 25 MMOL/L (ref 20–31)
CARBON DIOXIDE: 26 MMOL/L (ref 20–31)
CARBON DIOXIDE: 27 MMOL/L (ref 20–31)
CEA: 140.1 NG/ML
CEA: 159.2 NG/ML
CEA: 167.5 NG/ML
CEA: 204.9 NG/ML
CEA: 63 NG/ML
CEA: 63.9 NG/ML
CEA: 65.7 NG/ML
CEA: 67.2 NG/ML
CEA: 76.8 NG/ML
CEA: 83.1 NG/ML
CEA: 94.5 NG/ML
CHLORIDE: 100 MMOL/L (ref 98–110)
CHLORIDE: 101 MMOL/L (ref 98–110)
CHLORIDE: 101 MMOL/L (ref 98–110)
CHLORIDE: 94 MMOL/L (ref 98–110)
CHLORIDE: 97 MMOL/L (ref 98–110)
CHLORIDE: 98 MMOL/L (ref 98–110)
CHLORIDE: 98 MMOL/L (ref 98–110)
CHLORIDE: 99 MMOL/L (ref 98–110)
CHLORIDE: 99 MMOL/L (ref 98–110)
COLOR: YELLOW
CRCL (C&G) (MOSAIQ HL): 52.77 ML/MIN
CRCL (C&G) (MOSAIQ HL): 59.66 ML/MIN
CRCL (C&G) (MOSAIQ HL): 61.18 ML/MIN
CRCL (C&G) (MOSAIQ HL): 61.66 ML/MIN
CRCL (C&G) (MOSAIQ HL): 63.19 ML/MIN
CRCL (C&G) (MOSAIQ HL): 64.21 ML/MIN
CRCL (C&G) (MOSAIQ HL): 64.57 ML/MIN
CRCL (C&G) (MOSAIQ HL): 64.66 ML/MIN
CRCL (C&G) (MOSAIQ HL): 64.71 ML/MIN
CRCL (C&G) (MOSAIQ HL): 66.03 ML/MIN
CRCL (C&G) (MOSAIQ HL): 66.35 ML/MIN
CRCL (C&G) (MOSAIQ HL): 66.61 ML/MIN
CRCL (C&G) (MOSAIQ HL): 68.82 ML/MIN
CRCL (C&G) (MOSAIQ HL): 71.37 ML/MIN
CREATININE CLEARANCE (MOSAIQ HL): 55.7 ML/MIN
CREATININE CLEARANCE (MOSAIQ HL): 59.6 ML/MIN
CREATININE CLEARANCE (MOSAIQ HL): 62.1 ML/MIN
CREATININE CLEARANCE (MOSAIQ HL): 63.5 ML/MIN
CREATININE CLEARANCE (MOSAIQ HL): 66.7 ML/MIN
CREATININE CLEARANCE (MOSAIQ HL): 67.5 ML/MIN
CREATININE CLEARANCE (MOSAIQ HL): 68.4 ML/MIN
CREATININE CLEARANCE (MOSAIQ HL): 68.4 ML/MIN
CREATININE CLEARANCE (MOSAIQ HL): 69.2 ML/MIN
CREATININE CLEARANCE (MOSAIQ HL): 71.1 ML/MIN
CREATININE CLEARANCE (MOSAIQ HL): 75 ML/MIN
CREATININE CLEARANCE (MOSAIQ HL): 77.1 ML/MIN
CREATININE: 0.7 MG/DL (ref 0.7–1.18)
CREATININE: 0.72 MG/DL (ref 0.7–1.18)
CREATININE: 0.76 MG/DL (ref 0.7–1.18)
CREATININE: 0.78 MG/DL (ref 0.7–1.18)
CREATININE: 0.79 MG/DL (ref 0.7–1.18)
CREATININE: 0.79 MG/DL (ref 0.7–1.18)
CREATININE: 0.8 MG/DL (ref 0.7–1.18)
CREATININE: 0.81 MG/DL (ref 0.7–1.18)
CREATININE: 0.85 MG/DL (ref 0.7–1.18)
CREATININE: 0.87 MG/DL (ref 0.7–1.18)
CREATININE: 0.92 MG/DL (ref 0.7–1.18)
CREATININE: 0.97 MG/DL (ref 0.7–1.18)
CULTURE: NORMAL
EGFR AFRICAN AMERICAN: 101 ML/MIN/1.73M2
EGFR AFRICAN AMERICAN: 102 ML/MIN/1.73M2
EGFR AFRICAN AMERICAN: 103 ML/MIN/1.73M2
EGFR AFRICAN AMERICAN: 106 ML/MIN/1.73M2
EGFR AFRICAN AMERICAN: 107 ML/MIN/1.73M2
EGFR AFRICAN AMERICAN: 88 ML/MIN/1.73M2
EGFR AFRICAN AMERICAN: 95 ML/MIN/1.73M2
EGFR AFRICAN AMERICAN: 98 ML/MIN/1.73M2
EGFR AFRICAN AMERICAN: 99 ML/MIN/1.73M2
EGFR NON-AFR. AMERICAN: 76 ML/MIN/1.73M2
EGFR NON-AFR. AMERICAN: 82 ML/MIN/1.73M2
EGFR NON-AFR. AMERICAN: 84 ML/MIN/1.73M2
EGFR NON-AFR. AMERICAN: 85 ML/MIN/1.73M2
EGFR NON-AFR. AMERICAN: 87 ML/MIN/1.73M2
EGFR NON-AFR. AMERICAN: 88 ML/MIN/1.73M2
EGFR NON-AFR. AMERICAN: 89 ML/MIN/1.73M2
EGFR NON-AFR. AMERICAN: 91 ML/MIN/1.73M2
EGFR NON-AFR. AMERICAN: 92 ML/MIN/1.73M2
EOS%: 0.1 %
EOS%: 0.2 %
EOS%: 0.3 %
EOS%: 0.3 %
EOS%: 0.5 %
EOS%: 0.5 %
EOS%: 0.6 %
EOS%: 0.8 %
EOS%: 0.9 %
EOS%: 1.2 %
EOS%: 1.2 %
EOS%: 1.3 %
EOS%: 1.9 %
EOS%: 2.3 %
EOS%: 3.3 %
EOS%: 6.1 %
EOS: 0 10^3/UL
EOS: 0.1 10^3/UL
EOS: 0.3 10^3/UL
FOLATE, SERUM: 16.7 NG/ML
GLOBULIN: 2.5 G/DL (CALC) (ref 1.9–3.7)
GLOBULIN: 2.5 G/DL (CALC) (ref 1.9–3.7)
GLOBULIN: 2.7 G/DL (CALC) (ref 1.9–3.7)
GLOBULIN: 2.8 G/DL (CALC) (ref 1.9–3.7)
GLOBULIN: 2.8 G/DL (CALC) (ref 1.9–3.7)
GLOBULIN: 2.9 G/DL (CALC) (ref 1.9–3.7)
GLOBULIN: 2.9 G/DL (CALC) (ref 1.9–3.7)
GLOBULIN: 3.1 G/DL (CALC) (ref 1.9–3.7)
GLOBULIN: 3.1 G/DL (CALC) (ref 1.9–3.7)
GLOBULIN: 3.4 G/DL (CALC) (ref 1.9–3.7)
GLOBULIN: 3.4 G/DL (CALC) (ref 1.9–3.7)
GLUCOSE: 101 MG/DL (ref 65–99)
GLUCOSE: 106 MG/DL (ref 65–99)
GLUCOSE: 110 MG/DL (ref 65–99)
GLUCOSE: 114 MG/DL (ref 65–99)
GLUCOSE: 122 MG/DL (ref 65–99)
GLUCOSE: 122 MG/DL (ref 65–99)
GLUCOSE: 128 MG/DL (ref 65–99)
GLUCOSE: 136 MG/DL (ref 65–99)
GLUCOSE: 178 MG/DL (ref 65–99)
GLUCOSE: 183 MG/DL (ref 65–99)
GLUCOSE: 212 MG/DL (ref 65–99)
GLUCOSE: 249 MG/DL (ref 65–99)
GLUCOSE: 80 MG/DL (ref 65–99)
GLUCOSE: 94 MG/DL (ref 65–99)
GLUCOSE: NEGATIVE
HCT: 31.8 % (ref 38–54)
HCT: 32.5 % (ref 38–54)
HCT: 34.6 % (ref 38–54)
HCT: 35.1 % (ref 38–54)
HCT: 35.3 % (ref 38–54)
HCT: 35.4 % (ref 38–54)
HCT: 35.6 % (ref 38–54)
HCT: 36.8 % (ref 38–54)
HCT: 37.5 % (ref 38–54)
HCT: 37.7 % (ref 38–54)
HCT: 37.7 % (ref 38–54)
HCT: 38 % (ref 38–54)
HCT: 38.8 % (ref 38–54)
HCT: 39.2 % (ref 38–54)
HCT: 39.7 % (ref 38–54)
HCT: 39.8 % (ref 38–54)
HCT: 43.4 % (ref 38–54)
HCT: 43.7 % (ref 38–54)
HGB: 10 G/DL (ref 12–18)
HGB: 10.5 G/DL (ref 12–18)
HGB: 10.9 G/DL (ref 12–18)
HGB: 11.1 G/DL (ref 12–18)
HGB: 11.1 G/DL (ref 12–18)
HGB: 11.2 G/DL (ref 12–18)
HGB: 11.2 G/DL (ref 12–18)
HGB: 11.8 G/DL (ref 12–18)
HGB: 12 G/DL (ref 12–18)
HGB: 12 G/DL (ref 12–18)
HGB: 12.3 G/DL (ref 12–18)
HGB: 12.5 G/DL (ref 12–18)
HGB: 12.8 G/DL (ref 12–18)
HGB: 12.9 G/DL (ref 12–18)
HGB: 13.2 G/DL (ref 12–18)
HGB: 13.4 G/DL (ref 12–18)
HGB: 14.5 G/DL (ref 12–18)
HGB: 14.5 G/DL (ref 12–18)
HYALINE CAST: ABNORMAL /LPF
KETONES: NEGATIVE
LEUKOCYTE ESTERASE: NEGATIVE
LYMPH%: 10.7 % (ref 12–44)
LYMPH%: 13.6 % (ref 12–44)
LYMPH%: 14.3 % (ref 12–44)
LYMPH%: 15.2 % (ref 12–44)
LYMPH%: 17.6 % (ref 12–44)
LYMPH%: 18.1 % (ref 12–44)
LYMPH%: 19.9 % (ref 12–44)
LYMPH%: 26.8 % (ref 12–44)
LYMPH%: 27.6 % (ref 12–44)
LYMPH%: 27.9 % (ref 12–44)
LYMPH%: 3.2 % (ref 12–44)
LYMPH%: 31.5 % (ref 12–44)
LYMPH%: 34.4 % (ref 12–44)
LYMPH%: 36 % (ref 12–44)
LYMPH%: 40.7 % (ref 12–44)
LYMPH%: 5.9 % (ref 12–44)
LYMPH%: 9 % (ref 12–44)
LYMPH%: 9.4 % (ref 12–44)
LYMPH: 0.6 10^3/UL (ref 0.8–2.8)
LYMPH: 0.6 10^3/UL (ref 0.8–2.8)
LYMPH: 0.7 10^3/UL (ref 0.8–2.8)
LYMPH: 0.8 10^3/UL (ref 0.8–2.8)
LYMPH: 0.8 10^3/UL (ref 0.8–2.8)
LYMPH: 0.9 10^3/UL (ref 0.8–2.8)
LYMPH: 1 10^3/UL (ref 0.8–2.8)
LYMPH: 1.1 10^3/UL (ref 0.8–2.8)
LYMPH: 1.1 10^3/UL (ref 0.8–2.8)
LYMPH: 1.2 10^3/UL (ref 0.8–2.8)
LYMPH: 1.2 10^3/UL (ref 0.8–2.8)
LYMPH: 1.3 10^3/UL (ref 0.8–2.8)
LYMPH: 1.7 10^3/UL (ref 0.8–2.8)
LYMPH: 1.7 10^3/UL (ref 0.8–2.8)
MCH: 28.7 PG (ref 26–33)
MCH: 28.9 PG (ref 26–33)
MCH: 29.3 PG (ref 26–33)
MCH: 29.3 PG (ref 26–33)
MCH: 29.6 PG (ref 26–33)
MCH: 29.7 PG (ref 26–33)
MCH: 29.7 PG (ref 26–33)
MCH: 29.8 PG (ref 26–33)
MCH: 29.9 PG (ref 26–33)
MCH: 30 PG (ref 26–33)
MCH: 30.1 PG (ref 26–33)
MCH: 30.2 PG (ref 26–33)
MCH: 30.2 PG (ref 26–33)
MCHC: 30.9 G/DL (ref 31–36)
MCHC: 31.1 G/DL (ref 31–36)
MCHC: 31.3 G/DL (ref 31–36)
MCHC: 31.4 G/DL (ref 31–36)
MCHC: 31.4 G/DL (ref 31–36)
MCHC: 31.5 G/DL (ref 31–36)
MCHC: 31.6 G/DL (ref 31–36)
MCHC: 31.8 G/DL (ref 31–36)
MCHC: 32.1 G/DL (ref 31–36)
MCHC: 32.3 G/DL (ref 31–36)
MCHC: 32.9 G/DL (ref 31–36)
MCHC: 33.2 G/DL (ref 31–36)
MCHC: 33.2 G/DL (ref 31–36)
MCHC: 33.3 G/DL (ref 31–36)
MCHC: 33.4 G/DL (ref 31–36)
MCHC: 33.4 G/DL (ref 31–36)
MCHC: 33.7 G/DL (ref 31–36)
MCHC: 33.7 G/DL (ref 31–36)
MCV: 86.4 FML (ref 82–100)
MCV: 86.5 FML (ref 82–100)
MCV: 88 FML (ref 82–100)
MCV: 88 FML (ref 82–100)
MCV: 88.6 FML (ref 82–100)
MCV: 88.6 FML (ref 82–100)
MCV: 89.3 FML (ref 82–100)
MCV: 89.9 FML (ref 82–100)
MCV: 92.1 FML (ref 82–100)
MCV: 94 FML (ref 82–100)
MCV: 94 FML (ref 82–100)
MCV: 94.4 FML (ref 82–100)
MCV: 95.2 FML (ref 82–100)
MCV: 95.2 FML (ref 82–100)
MCV: 95.9 FML (ref 82–100)
MCV: 95.9 FML (ref 82–100)
MCV: 96.3 FML (ref 82–100)
MCV: 96.4 FML (ref 82–100)
MONO%: 0.2 % (ref 2–12)
MONO%: 1.6 % (ref 2–12)
MONO%: 10.5 % (ref 2–12)
MONO%: 11.5 % (ref 2–12)
MONO%: 13.7 % (ref 2–12)
MONO%: 14.2 % (ref 2–12)
MONO%: 15.5 % (ref 2–12)
MONO%: 15.9 % (ref 2–12)
MONO%: 17.1 % (ref 2–12)
MONO%: 18.7 % (ref 2–12)
MONO%: 20.5 % (ref 2–12)
MONO%: 21.4 % (ref 2–12)
MONO%: 21.5 % (ref 2–12)
MONO%: 21.5 % (ref 2–12)
MONO%: 23.9 % (ref 2–12)
MONO%: 24.7 % (ref 2–12)
MONO%: 31.3 % (ref 2–12)
MONO%: 8.5 % (ref 2–12)
MONO: 0.1 10^3/UL (ref 0.2–1)
MONO: 0.2 10^3/UL (ref 0.2–1)
MONO: 0.6 10^3/UL (ref 0.2–1)
MONO: 0.7 10^3/UL (ref 0.2–1)
MONO: 0.8 10^3/UL (ref 0.2–1)
MONO: 0.9 10^3/UL (ref 0.2–1)
MONO: 1 10^3/UL (ref 0.2–1)
MONO: 1 10^3/UL (ref 0.2–1)
MONO: 1.1 10^3/UL (ref 0.2–1)
MONO: 1.2 10^3/UL (ref 0.2–1)
MONO: 1.2 10^3/UL (ref 0.2–1)
MONO: 1.4 10^3/UL (ref 0.2–1)
MPV: 10 FML (ref 8.6–11.7)
MPV: 10.1 FML (ref 8.6–11.7)
MPV: 10.2 FML (ref 8.6–11.7)
MPV: 10.3 FML (ref 8.6–11.7)
MPV: 10.5 FML (ref 8.6–11.7)
MPV: 10.6 FML (ref 8.6–11.7)
MPV: 10.7 FML (ref 8.6–11.7)
MPV: 10.8 FML (ref 8.6–11.7)
MPV: 11.3 FML (ref 8.6–11.7)
MPV: 9.6 FML (ref 8.6–11.7)
MPV: 9.7 FML (ref 8.6–11.7)
MPV: 9.7 FML (ref 8.6–11.7)
NEUT%: 28.6 % (ref 47–76)
NEUT%: 36.2 % (ref 47–76)
NEUT%: 38.2 % (ref 47–76)
NEUT%: 41.8 % (ref 47–76)
NEUT%: 46.4 % (ref 47–76)
NEUT%: 49.4 % (ref 47–76)
NEUT%: 56.7 % (ref 47–76)
NEUT%: 60.5 % (ref 47–76)
NEUT%: 63.7 % (ref 47–76)
NEUT%: 64.1 % (ref 47–76)
NEUT%: 67.2 % (ref 47–76)
NEUT%: 68.5 % (ref 47–76)
NEUT%: 73.2 % (ref 47–76)
NEUT%: 74 % (ref 47–76)
NEUT%: 74.8 % (ref 47–76)
NEUT%: 77.9 % (ref 47–76)
NEUT%: 91 % (ref 47–76)
NEUT%: 96.2 % (ref 47–76)
NEUT: 1.1 10^3/UL (ref 1.5–7.1)
NEUT: 1.5 10^3/UL (ref 1.5–7.1)
NEUT: 1.9 10^3/UL (ref 1.5–7.1)
NEUT: 10.6 10^3/UL (ref 1.5–7.1)
NEUT: 2.1 10^3/UL (ref 1.5–7.1)
NEUT: 2.2 10^3/UL (ref 1.5–7.1)
NEUT: 2.9 10^3/UL (ref 1.5–7.1)
NEUT: 3 10^3/UL (ref 1.5–7.1)
NEUT: 3.4 10^3/UL (ref 1.5–7.1)
NEUT: 4.3 10^3/UL (ref 1.5–7.1)
NEUT: 4.3 10^3/UL (ref 1.5–7.1)
NEUT: 4.8 10^3/UL (ref 1.5–7.1)
NEUT: 49.7 10^3/UL (ref 1.5–7.1)
NEUT: 5.1 10^3/UL (ref 1.5–7.1)
NITRITE: NEGATIVE
OCCULT BLOOD: NEGATIVE
PH: 7 (ref 5–8)
PLT: 122 10^3/UL (ref 150–375)
PLT: 133 10^3/UL (ref 150–375)
PLT: 139 10^3/UL (ref 150–375)
PLT: 147 10^3/UL (ref 150–375)
PLT: 156 10^3/UL (ref 150–375)
PLT: 160 10^3/UL (ref 150–375)
PLT: 161 10^3/UL (ref 150–375)
PLT: 162 10^3/UL (ref 150–375)
PLT: 163 10^3/UL (ref 150–375)
PLT: 174 10^3/UL (ref 150–375)
PLT: 177 10^3/UL (ref 150–375)
PLT: 180 10^3/UL (ref 150–375)
PLT: 191 10^3/UL (ref 150–375)
PLT: 199 10^3/UL (ref 150–375)
PLT: 202 10^3/UL (ref 150–375)
PLT: 206 10^3/UL (ref 150–375)
PLT: 214 10^3/UL (ref 150–375)
PLT: 252 10^3/UL (ref 150–375)
POTASSIUM: 4 MMOL/L (ref 3.5–5.3)
POTASSIUM: 4.1 MMOL/L (ref 3.5–5.3)
POTASSIUM: 4.1 MMOL/L (ref 3.5–5.3)
POTASSIUM: 4.2 MMOL/L (ref 3.5–5.3)
POTASSIUM: 4.2 MMOL/L (ref 3.5–5.3)
POTASSIUM: 4.3 MMOL/L (ref 3.5–5.3)
POTASSIUM: 4.5 MMOL/L (ref 3.5–5.3)
POTASSIUM: 4.6 MMOL/L (ref 3.5–5.3)
POTASSIUM: 4.6 MMOL/L (ref 3.5–5.3)
PROTEIN, TOTAL: 5.4 G/DL (ref 6.1–8.1)
PROTEIN, TOTAL: 5.5 G/DL (ref 6.1–8.1)
PROTEIN, TOTAL: 5.6 G/DL (ref 6.1–8.1)
PROTEIN, TOTAL: 5.7 G/DL (ref 6.1–8.1)
PROTEIN, TOTAL: 5.8 G/DL (ref 6.1–8.1)
PROTEIN, TOTAL: 6.1 G/DL (ref 6.1–8.1)
PROTEIN, TOTAL: 6.3 G/DL (ref 6.1–8.1)
PROTEIN, TOTAL: 6.3 G/DL (ref 6.1–8.1)
PROTEIN, TOTAL: 6.5 G/DL (ref 6.1–8.1)
PROTEIN, TOTAL: 6.8 G/DL (ref 6.1–8.1)
PROTEIN, TOTAL: 7.4 G/DL (ref 6.1–8.1)
PROTEIN: NEGATIVE
RBC: 3.37 10^6/UL (ref 4.2–6.2)
RBC: 3.53 10^6/UL (ref 4.2–6.2)
RBC: 3.64 10^6/UL (ref 4.2–6.2)
RBC: 3.68 10^6/UL (ref 4.2–6.2)
RBC: 3.68 10^6/UL (ref 4.2–6.2)
RBC: 3.72 10^6/UL (ref 4.2–6.2)
RBC: 3.74 10^6/UL (ref 4.2–6.2)
RBC: 3.93 10^6/UL (ref 4.2–6.2)
RBC: 4.01 10^6/UL (ref 4.2–6.2)
RBC: 4.03 10^6/UL (ref 4.2–6.2)
RBC: 4.12 10^6/UL (ref 4.2–6.2)
RBC: 4.26 10^6/UL (ref 4.2–6.2)
RBC: 4.29 10^6/UL (ref 4.2–6.2)
RBC: 4.36 10^6/UL (ref 4.2–6.2)
RBC: 4.49 10^6/UL (ref 4.2–6.2)
RBC: 4.51 10^6/UL (ref 4.2–6.2)
RBC: 5.02 10^6/UL (ref 4.2–6.2)
RBC: 5.06 10^6/UL (ref 4.2–6.2)
RBC: ABNORMAL /HPF
RDW-CV: 14 %
RDW-CV: 14.5 %
RDW-CV: 15.3 %
RDW-CV: 15.4 %
RDW-CV: 15.6 %
RDW-CV: 15.7 %
RDW-CV: 16.1 %
RDW-CV: 16.3 %
RDW-CV: 16.8 %
RDW-CV: 17 %
RDW-CV: 17.3 %
RDW-CV: 17.5 %
RDW-CV: 17.5 %
RDW-CV: 17.6 %
RDW-SD: 43.7 FML (ref 36–50)
RDW-SD: 45.5 FML (ref 36–50)
RDW-SD: 48.2 FML (ref 36–50)
RDW-SD: 48.5 FML (ref 36–50)
RDW-SD: 49.5 FML (ref 36–50)
RDW-SD: 49.8 FML (ref 36–50)
RDW-SD: 52.6 FML (ref 36–50)
RDW-SD: 53.5 FML (ref 36–50)
RDW-SD: 54.4 FML (ref 36–50)
RDW-SD: 55.1 FML (ref 36–50)
RDW-SD: 56.2 FML (ref 36–50)
RDW-SD: 57.3 FML (ref 36–50)
RDW-SD: 57.7 FML (ref 36–50)
RDW-SD: 58.3 FML (ref 36–50)
RDW-SD: 58.4 FML (ref 36–50)
RDW-SD: 58.6 FML (ref 36–50)
RDW-SD: 58.9 FML (ref 36–50)
RDW-SD: 59 FML (ref 36–50)
REFLEXIVE URINE CULTURE: NORMAL
SODIUM: 130 MMOL/L (ref 135–146)
SODIUM: 131 MMOL/L (ref 135–146)
SODIUM: 132 MMOL/L (ref 135–146)
SODIUM: 133 MMOL/L (ref 135–146)
SODIUM: 134 MMOL/L (ref 135–146)
SODIUM: 135 MMOL/L (ref 135–146)
SPECIFIC GRAVITY: 1.01 (ref 1–1.03)
SQUAMOUS EPITHELIAL CELLS: ABNORMAL /HPF
UREA NITROGEN (BUN): 13 MG/DL (ref 7–25)
UREA NITROGEN (BUN): 14 MG/DL (ref 7–25)
UREA NITROGEN (BUN): 15 MG/DL (ref 7–25)
UREA NITROGEN (BUN): 16 MG/DL (ref 7–25)
UREA NITROGEN (BUN): 19 MG/DL (ref 7–25)
UREA NITROGEN (BUN): 21 MG/DL (ref 7–25)
UREA NITROGEN (BUN): 21 MG/DL (ref 7–25)
UREA NITROGEN (BUN): 22 MG/DL (ref 7–25)
UREA NITROGEN (BUN): 22 MG/DL (ref 7–25)
UREA NITROGEN (BUN): 24 MG/DL (ref 7–25)
URINE PROTEIN/DIPSTICK: NORMAL
URINE PROTEIN: ABNORMAL MG/DL
URINE PROTEIN: ABNORMAL MG/DL
URINE PROTEIN: NEGATIVE MG/DL
URINE PROTEIN: NEGATIVE MG/DL
URINE PROTEIN: NORMAL MG/DL
VITAMIN B12: 1840 PG/ML (ref 200–1100)
WBC: 11.6 10^3/UL (ref 4.3–11)
WBC: 2.5 10^3/UL (ref 4.3–11)
WBC: 3 10^3/UL (ref 4.3–11)
WBC: 3 10^3/UL (ref 4.3–11)
WBC: 3.7 10^3/UL (ref 4.3–11)
WBC: 4 10^3/UL (ref 4.3–11)
WBC: 4.4 10^3/UL (ref 4.3–11)
WBC: 4.7 10^3/UL (ref 4.3–11)
WBC: 4.8 10^3/UL (ref 4.3–11)
WBC: 4.9 10^3/UL (ref 4.3–11)
WBC: 5 10^3/UL (ref 4.3–11)
WBC: 51.7 10^3/UL (ref 4.3–11)
WBC: 6.2 10^3/UL (ref 4.3–11)
WBC: 6.6 10^3/UL (ref 4.3–11)
WBC: 6.6 10^3/UL (ref 4.3–11)
WBC: 6.7 10^3/UL (ref 4.3–11)
WBC: 6.8 10^3/UL (ref 4.3–11)
WBC: 6.9 10^3/UL (ref 4.3–11)
WBC: ABNORMAL /HPF

## 2020-10-11 VITALS
SYSTOLIC BLOOD PRESSURE: 136 MMHG | DIASTOLIC BLOOD PRESSURE: 102 MMHG | WEIGHT: 120 LBS | HEIGHT: 69 IN | BODY MASS INDEX: 17.77 KG/M2

## 2020-10-11 VITALS — DIASTOLIC BLOOD PRESSURE: 86 MMHG | SYSTOLIC BLOOD PRESSURE: 132 MMHG | WEIGHT: 125 LBS

## 2020-10-11 VITALS — WEIGHT: 127.01 LBS | DIASTOLIC BLOOD PRESSURE: 84 MMHG | TEMPERATURE: 95.7 F | SYSTOLIC BLOOD PRESSURE: 118 MMHG

## 2020-10-11 VITALS
BODY MASS INDEX: 18.51 KG/M2 | HEIGHT: 69 IN | DIASTOLIC BLOOD PRESSURE: 80 MMHG | SYSTOLIC BLOOD PRESSURE: 126 MMHG | WEIGHT: 125 LBS

## 2020-10-11 VITALS
SYSTOLIC BLOOD PRESSURE: 128 MMHG | DIASTOLIC BLOOD PRESSURE: 78 MMHG | SYSTOLIC BLOOD PRESSURE: 112 MMHG | WEIGHT: 130 LBS | WEIGHT: 128.99 LBS | DIASTOLIC BLOOD PRESSURE: 90 MMHG

## 2020-10-11 VITALS
HEART RATE: 88 BPM | WEIGHT: 121.01 LBS | SYSTOLIC BLOOD PRESSURE: 134 MMHG | DIASTOLIC BLOOD PRESSURE: 80 MMHG | DIASTOLIC BLOOD PRESSURE: 82 MMHG | DIASTOLIC BLOOD PRESSURE: 84 MMHG | SYSTOLIC BLOOD PRESSURE: 110 MMHG | SYSTOLIC BLOOD PRESSURE: 142 MMHG

## 2020-10-11 VITALS — DIASTOLIC BLOOD PRESSURE: 94 MMHG | SYSTOLIC BLOOD PRESSURE: 130 MMHG | WEIGHT: 128 LBS

## 2020-10-11 VITALS — DIASTOLIC BLOOD PRESSURE: 96 MMHG | SYSTOLIC BLOOD PRESSURE: 136 MMHG | WEIGHT: 127.01 LBS

## 2020-10-11 VITALS — DIASTOLIC BLOOD PRESSURE: 78 MMHG | HEART RATE: 107 BPM | SYSTOLIC BLOOD PRESSURE: 108 MMHG | TEMPERATURE: 96.7 F

## 2020-10-11 VITALS — WEIGHT: 122 LBS | SYSTOLIC BLOOD PRESSURE: 144 MMHG | DIASTOLIC BLOOD PRESSURE: 96 MMHG

## 2020-10-11 VITALS — WEIGHT: 122.99 LBS

## 2020-10-11 VITALS — SYSTOLIC BLOOD PRESSURE: 128 MMHG | WEIGHT: 128 LBS | DIASTOLIC BLOOD PRESSURE: 92 MMHG

## 2020-10-11 VITALS
DIASTOLIC BLOOD PRESSURE: 90 MMHG | BODY MASS INDEX: 19.55 KG/M2 | WEIGHT: 131.99 LBS | SYSTOLIC BLOOD PRESSURE: 136 MMHG | HEIGHT: 69 IN

## 2020-10-11 VITALS — WEIGHT: 125.99 LBS

## 2020-10-11 VITALS — WEIGHT: 126.5 LBS

## 2020-10-11 VITALS — WEIGHT: 131 LBS

## 2020-10-11 VITALS — WEIGHT: 128.51 LBS

## 2023-01-30 NOTE — PROGRESS NOTES
Left Message to call back for PFT results as noted below. Advised to contact clinic to discuss.    [address] [facility] Cardiology  Progress Note    Smooth Brown Patient Status:  Inpatient    1940 MRN [id number]   [facility] Attending Catrachito Galarza MD   Hosp Day # 2 PCP Keiry Jason MD     Outpatient cardiologist:  NA on.  Cardiac:irregularly irregular and tachycardic; no murmurs/rubs/gallops are appreciated  Lungs: decreased bilateral basilar breath sounds, no accessory muscle use  Abdomen: Soft, non-tender; bowel sounds are normoactive  Extremities: No clubbing/cyanos the care of your patient. Please do not hesitate to contact me if you have any questions.     Marilynn Dunn MD  4/11/2018  11:05 AM

## (undated) NOTE — LETTER
[facility] [address], [address]  Consent for Procedure/Sedation    Date:     Time:       1. I authorize the performance upon [address] the following:  VENOUS ACCESS PORT IMPLANT     2.  I authorize Dr. Tim Arreola (and who ________________________________    ___________________    Witness: _________________________      Date: ___________________    Printed: 2017   5:22 PM  Patient Name: Isabelle Fox        : 1940       Medical Record #: [id number]

## (undated) NOTE — ED AVS SNAPSHOT
[facility] Emergency Department    [place]  [address] [address]    Phone:  [phone]    Fax:  [phone]           Alcira Lee   MRN: [id number]    Department:  [facility] Emergency Department   Date of Visit:  4/ IF THERE IS ANY CHANGE OR WORSENING OF YOUR CONDITION, CALL YOUR PRIMARY CARE PHYSICIAN AT ONCE OR RETURN IMMEDIATELY TO THE EMERGENCY DEPARTMENT.     If you have been prescribed any medication(s), please fill your prescription right away and begin taking t

## (undated) NOTE — ED AVS SNAPSHOT
[facility] Emergency Department    [place]  [address] [address]    Phone:  [phone]    Fax:  [phone]           Esther Haile   MRN: [id number]    Department:  [facility] Emergency Department   Date of Visit:  4/ Click [email or web address]      Or call [phone]    If you have any problems with your follow-up, please call our  at [phone]    Si usted tiene algun problema con garces sequimiento, por favor llame a nuestro adminstrador de casos al (46 24-Hour Pharmacies        Pharmacy Address Phone Number   [address] ([address]) [place] ([address] [place] [address].  ([address] [facility] [address] discharge instructions in Invenrahart by going to Visits < Admission Summaries. If you've been to the Emergency Department or your doctor's office, you can view your past visit information in Invenrahart by going to Visits < Visit Summaries. Puzzlium questions?

## (undated) NOTE — ED AVS SNAPSHOT
[facility] Emergency Department    [facility]  [address] [address]    Phone:  [phone]    Fax:  [phone]           Hilary Bone   MRN: [id number]    Department:  [facility] Emergency Department   Date of Visit:  4/ IF THERE IS ANY CHANGE OR WORSENING OF YOUR CONDITION, CALL YOUR PRIMARY CARE PHYSICIAN AT ONCE OR RETURN IMMEDIATELY TO THE EMERGENCY DEPARTMENT.     If you have been prescribed any medication(s), please fill your prescription right away and begin taking t

## (undated) NOTE — ED AVS SNAPSHOT
[facility] Emergency Department    [place]  [address] [address]    Phone:  [phone]    Fax:  [phone]           Jordon Palacios   MRN: [id number]    Department:  [facility] Emergency Department   Date of Visit:  4/ self-assessment the day after your visit. You may also receive a call from our patient liason soon after your visit. Also, some patients receive a detailed feedback survey mailed to them a week after the visit.   If you receive this, we would really apprec [address] [phone] [address] [facility] [id number] [address] ([address]) [address] [place] [address]

## (undated) NOTE — ED AVS SNAPSHOT
[facility] Emergency Department    [place]  [address] [address]    Phone:  [phone]    Fax:  [phone]           Jordon Palacios   MRN: [id number]    Department:  [facility] Emergency Department   Date of Visit:  4/ IF THERE IS ANY CHANGE OR WORSENING OF YOUR CONDITION, CALL YOUR PRIMARY CARE PHYSICIAN AT ONCE OR RETURN IMMEDIATELY TO THE EMERGENCY DEPARTMENT.     If you have been prescribed any medication(s), please fill your prescription right away and begin taking t

## (undated) NOTE — ED AVS SNAPSHOT
[facility] Emergency Department    [place]  [address] [address]    Phone:  [phone]    Fax:  [phone]           Christina Batemanhafsa   MRN: [id number]    Department:  [facility] Emergency Department   Date of Visit:  4/ Si usted tiene algun problema con garces sequimiento, por favor llame a nuestro adminstrador de ne al [phone]    Expect to receive an electronic request (by e-mail or text) to complete a self-assessment the day after your visit.   You may also receiv Lian De Los Santos [address] Lee[address] ([facility]) Ramesh 7 Faina Stern. ([facility]) [address]  ([address] [facility] [address]) [address] [facility] office, you can view your past visit information in Nveloped by going to Visits < Visit Summaries. Nveloped questions? Call [phone] for help. Nveloped is NOT to be used for urgent needs. For medical emergencies, dial 911.

## (undated) NOTE — IP AVS SNAPSHOT
Patient Demographics     Address  [facility] [address] [address] Phone  [phone] (Home) *Preferred*  [phone] [facility]) E-mail Address  [email or web address]      Emergency Contact(s)     Name Rene [place] Work Mobile    Dr. Mark Simental [id number] MethylPREDNISolone Sodium Succ (Solu-MEDROL) injection 80 mg 04/17/18 0031 Given      [id number] MethylPREDNISolone Sodium Succ (Solu-MEDROL) injection 80 mg 04/17/18 0901 Given      [id number] Piperacillin Sod-Tazobactam So (ZOSYN) 3.375 g in dex Temp  98.2 °F (36.8 °C) Filed at 04/17/2018 0800   SpO2   89 % Filed at 04/17/2018 1200      Patient's Most Recent Weight    Flowsheet Row Most Recent Value   Patient Weight  50.4 kg (111 lb 1.8 oz)      CPAP Settings (Inpatient)    Flowsheet Row Most Rece Myelocyte Absolute Manual 0.25 <0.01 x10(3) uL [facility]   Neutrophils % Manual 68 % — [facility]   Band % 24 % — [facility]   Lymphocyte % Manual 2 % — [facility]   Monocyte % Manual 3 % — [facility]   Eosinophil % Manual 0 % — [facility]   Basophil % Note: Calculation is only valid when Albumin is less than 4.0g/dL.      Alkaline Phosphatase 177 45 - 117 U/L [facility]   AST 33 15 - 41 U/L [facility]   Alt 25 17 - 63 U/L — [facility]   Bilirubin, Total 0.6 0.1 - 2.0 mg/dL [facility]   Total Prot Influenza A PCR: Negative     Influenza B PCR: Negative     Parainfluenza 1 PCR: Negative     Parainlfuenza 2 PCR Negative     Parainlfuenza 3 PCR Negative     Parainlfuenza 4 PCR Negative     Resp Syncytial Virus PCR Negative     Bordetella Pertussis PC 12/16/2014: COLONOSCOPY,DIAGNOSTIC N/A      Comment: Procedure: COLONOSCOPY, POSSIBLE BIOPSY,                POSSIBLE POLYPECTOMY 72232;  Surgeon: Ayanna Nicole MD;  Location: [address] [place] [address]  4/28/17: OTHER SURGICAL HISTORY      Comm Lab  04/10/18   0510   RBC  3.08*   HGB  9.1*   HCT  29.0*   MCV  94.2   MCH  29.5   MCHC  31.4   RDW  17.0*   NEPRELIM  4.39   WBC  5.8   PLT  125.0*       BUN 14, creatinine 0.68  procalcitonin 0.35  Blood culture pendnign     CXR personally reviewed res Admission Diagnosis: Atrial fibrillation with rapid ventricular response (HCC) [I48.91]  Colon cancer metastasized to lung ([facility] [C18.9, C78.00]  Sepsis due to pneumonia ([facility] [J18.9, A41.9]    Reason for consult: resp failure      History of Present Illne Surgeon:  Matt Stern MD;  Location: [address] [place] [address]  7/18/2012: [facility] CATARACT EXTRACAP,INSERT LENS      Comment: Procedure: LEFT PHACOEMULSIFICATION OF                CATARACT WITH INTRAOCULAR LENS IMPLANT 78699; • Senna-Docusate Sodium  1 tablet Oral Daily   • piperacillin-tazobactam  3.375 g Intravenous Q8H   • vancomycin  15 mg/kg Intravenous Q12H   • nystatin  5 mL Oral QID     Continuous Infusing Medication:  • sodium chloride Stopped (04/11/18 0300)   • dilti Extremities:   Extremities normal, atraumatic, no cyanosis or edema   Pulses:   2+ and symmetric all extremities   Skin:   Skin color, texture, turgor normal, no rashes or lesions         Recent Labs   Lab  04/09/18   1911  04/10/18   0510  04/11/18   0509 - hold amiodarone for now given the possibility of amio toxicity to explain progressive findings. 4. PE / DVT: dx last admission  - cont lmwh  5. FEN: NPO  6. prophy: therapeutic lmwh  7. dispo: full code. Transfer to ICU.  D/w pt's daughter, Dr. Hernando Dickerson TD 05/28/10     Zoster Vaccine Live (Zostavax) 10/31/14

## (undated) NOTE — IP AVS SNAPSHOT
[address]            (For Outpatient Use Only) Initial Admit Date: 4/9/2018   Inpt/Obs Admit Date: Inpt: 4/9/18 / Obs: N/A   Discharge Date:    Tino Proper:  [de-identified]   MRN: [de-identified]   CSN: [id number]        ENCOUNTER  Patient C Subscriber ID:  Pt Rel to Subscriber:    Hospital Account Financial Class: Medicare    April 17, 2018